# Patient Record
Sex: MALE | Race: WHITE | Employment: OTHER | URBAN - METROPOLITAN AREA
[De-identification: names, ages, dates, MRNs, and addresses within clinical notes are randomized per-mention and may not be internally consistent; named-entity substitution may affect disease eponyms.]

---

## 2017-01-03 RX ORDER — LOVASTATIN 40 MG/1
TABLET ORAL
Qty: 30 TABLET | Refills: 5 | Status: SHIPPED | OUTPATIENT
Start: 2017-01-03 | End: 2017-08-08

## 2017-01-09 RX ORDER — WARFARIN SODIUM 5 MG/1
TABLET ORAL
Qty: 90 TABLET | Refills: 1 | Status: SHIPPED | OUTPATIENT
Start: 2017-01-09 | End: 2017-03-31 | Stop reason: DRUGHIGH

## 2017-01-09 NOTE — TELEPHONE ENCOUNTER
Was patient advised to contact pharmacy directly?:  Yes    Is patient out of meds or supply very low?:  Very low    Medication Requested:  Warfarin Sodium (COUMADIN) 5 MG Oral Tab    Dose:   5mg x 5 days, 2.5mg x 2 days    Is patient requesting a 30 or 90

## 2017-01-09 NOTE — TELEPHONE ENCOUNTER
Last OV pertinent to medication: 12/9/16 for DM check   Last refill date: 5/6/14     #/refills: #180 + 3  When pt was asked to return for OV: 6 months  Upcoming appt/reason: No future appt      Lab Results  Component Value Date   PT 14.9* 12/08/2013   INR

## 2017-01-19 ENCOUNTER — OFFICE VISIT (OUTPATIENT)
Dept: INTERNAL MEDICINE CLINIC | Facility: CLINIC | Age: 72
End: 2017-01-19

## 2017-01-19 VITALS
HEIGHT: 71 IN | RESPIRATION RATE: 16 BRPM | HEART RATE: 103 BPM | BODY MASS INDEX: 37.38 KG/M2 | TEMPERATURE: 98 F | WEIGHT: 267 LBS | SYSTOLIC BLOOD PRESSURE: 110 MMHG | DIASTOLIC BLOOD PRESSURE: 74 MMHG | OXYGEN SATURATION: 98 %

## 2017-01-19 DIAGNOSIS — G89.29 CHRONIC RIGHT-SIDED LOW BACK PAIN WITHOUT SCIATICA: ICD-10-CM

## 2017-01-19 DIAGNOSIS — M54.50 CHRONIC RIGHT-SIDED LOW BACK PAIN WITHOUT SCIATICA: ICD-10-CM

## 2017-01-19 DIAGNOSIS — H61.21 IMPACTED CERUMEN OF RIGHT EAR: Primary | ICD-10-CM

## 2017-01-19 PROCEDURE — 99213 OFFICE O/P EST LOW 20 MIN: CPT | Performed by: INTERNAL MEDICINE

## 2017-01-19 NOTE — PROGRESS NOTES
Ousmane Alvarez is a 66year old male  Patient presents with:  Ear Problem      HPI:   Right ear clogged for a few weeks, no pain, some vertigo potisitonal and losing hearing  Also ongoing LBP on the right,very positional, for 3 mos, never went to PT, s Status: Never Used                        Alcohol Use: Yes           0.0 oz/week       0 Standard drinks or equivalent per week     Patient Active Problem List:     Obesity, unspecified     Chronic atrial fibrillation (HCC)     Benign neoplasm of colon

## 2017-01-19 NOTE — PATIENT INSTRUCTIONS
Debrox or cerumenex to ears daily at bedtime and allow water to run freely into ears daily the next morning and return for earwash

## 2017-01-25 RX ORDER — GLIMEPIRIDE 2 MG/1
TABLET ORAL
Qty: 90 TABLET | Refills: 0 | Status: SHIPPED | OUTPATIENT
Start: 2017-01-25 | End: 2017-08-31

## 2017-01-26 ENCOUNTER — OFFICE VISIT (OUTPATIENT)
Dept: INTERNAL MEDICINE CLINIC | Facility: CLINIC | Age: 72
End: 2017-01-26

## 2017-01-26 VITALS
DIASTOLIC BLOOD PRESSURE: 80 MMHG | SYSTOLIC BLOOD PRESSURE: 122 MMHG | BODY MASS INDEX: 37.38 KG/M2 | RESPIRATION RATE: 16 BRPM | WEIGHT: 267 LBS | HEIGHT: 71 IN | TEMPERATURE: 98 F | OXYGEN SATURATION: 97 % | HEART RATE: 82 BPM

## 2017-01-26 DIAGNOSIS — H61.21 HEARING LOSS DUE TO CERUMEN IMPACTION, RIGHT: Primary | ICD-10-CM

## 2017-01-26 PROCEDURE — 69209 REMOVE IMPACTED EAR WAX UNI: CPT | Performed by: INTERNAL MEDICINE

## 2017-01-26 NOTE — PROGRESS NOTES
Jannette Garrett is a 66year old male  Patient presents with:  Ear Problem: Ear Wash      HPI:   Cerumen impaction right, using drops, feels a bit better     Current Outpatient Prescriptions:  carbamide peroxide 6.5 % Otic Solution Place 5 drops into gt Alcohol Use: Yes           0.0 oz/week       0 Standard drinks or equivalent per week     Patient Active Problem List:     Obesity, unspecified     Chronic atrial fibrillation (HCC)     Benign neoplasm of colon     Hypertrophy of prostate without urina

## 2017-02-03 ENCOUNTER — TELEPHONE (OUTPATIENT)
Dept: INTERNAL MEDICINE CLINIC | Facility: CLINIC | Age: 72
End: 2017-02-03

## 2017-02-03 DIAGNOSIS — N28.1 RENAL CYST: Primary | ICD-10-CM

## 2017-02-03 NOTE — TELEPHONE ENCOUNTER
Pt had an MRI 1/31/17, ordered by orthopedic surgeon Em Hairston, incidental finding of probable cyst on kidney. Result in Epic. Also has concerns about taking gabapentin. Aware office closed, pt okay with callback on Monday.

## 2017-02-06 NOTE — TELEPHONE ENCOUNTER
Spoke with pt. Advised as below that it's OK to take gabapentin for now and that renal cysts not likely to be clinically significant, but Kidney US recommended for further evaluation at this time. Pt voiced understanding.  Gave pt contact information for [facility]

## 2017-02-07 ENCOUNTER — HOSPITAL ENCOUNTER (OUTPATIENT)
Dept: ULTRASOUND IMAGING | Facility: HOSPITAL | Age: 72
Discharge: HOME | End: 2017-02-07
Attending: INTERNAL MEDICINE
Payer: MEDICARE

## 2017-02-07 DIAGNOSIS — N28.1 RENAL CYST: ICD-10-CM

## 2017-02-07 PROCEDURE — 76775 US EXAM ABDO BACK WALL LIM: CPT

## 2017-02-14 PROBLEM — M54.50 CHRONIC BILATERAL LOW BACK PAIN WITHOUT SCIATICA: Status: ACTIVE | Noted: 2017-02-14

## 2017-02-14 PROBLEM — G89.29 CHRONIC BILATERAL LOW BACK PAIN WITHOUT SCIATICA: Status: ACTIVE | Noted: 2017-02-14

## 2017-02-14 RX ORDER — GABAPENTIN 300 MG/1
CAPSULE ORAL
Qty: 270 CAPSULE | Refills: 0 | Status: ON HOLD | OUTPATIENT
Start: 2017-02-14 | End: 2017-05-20

## 2017-02-14 NOTE — TELEPHONE ENCOUNTER
Last OV pertinent to medication: 1/19/17  Last refill date: 11/19/16     #/refills: 270+0 refills  When pt was asked to return for OV: 6 months from 12/9/16 visit  Upcoming appt/reason: none scheduled yet     Lab Results  Component Value Date   * 09

## 2017-03-03 ENCOUNTER — LAB ENCOUNTER (OUTPATIENT)
Dept: LAB | Facility: HOSPITAL | Age: 72
End: 2017-03-03
Attending: PODIATRIST
Payer: MEDICARE

## 2017-03-03 ENCOUNTER — OFFICE VISIT (OUTPATIENT)
Dept: WOUND CARE | Age: 72
End: 2017-03-03
Attending: PODIATRIST
Payer: MEDICARE

## 2017-03-03 DIAGNOSIS — E11.621 TYPE 2 DIABETES MELLITUS WITH FOOT ULCER (HCC): Primary | ICD-10-CM

## 2017-03-03 DIAGNOSIS — L97.919 NON-PRESSURE CHRONIC ULCER OF UNSPECIFIED PART OF RIGHT LOWER LEG WITH UNSPECIFIED SEVERITY (HCC): ICD-10-CM

## 2017-03-03 DIAGNOSIS — L97.522 NON-PRESSURE CHRONIC ULCER OF OTHER PART OF LEFT FOOT WITH FAT LAYER EXPOSED (HCC): ICD-10-CM

## 2017-03-03 DIAGNOSIS — L97.512 NON-PRESSURE CHRONIC ULCER OF OTHER PART OF RIGHT FOOT WITH FAT LAYER EXPOSED (HCC): ICD-10-CM

## 2017-03-03 DIAGNOSIS — L97.509 TYPE 2 DIABETES MELLITUS WITH FOOT ULCER (HCC): Primary | ICD-10-CM

## 2017-03-03 DIAGNOSIS — L97.509 DIABETIC FOOT ULCER (HCC): ICD-10-CM

## 2017-03-03 DIAGNOSIS — E11.621 DIABETIC FOOT ULCER (HCC): ICD-10-CM

## 2017-03-03 LAB
ALBUMIN SERPL-MCNC: 3.8 G/DL (ref 3.5–4.8)
ALP LIVER SERPL-CCNC: 35 U/L (ref 45–117)
ALT SERPL-CCNC: 24 U/L (ref 17–63)
AST SERPL-CCNC: 21 U/L (ref 15–41)
BASOPHILS # BLD AUTO: 0.12 X10(3) UL (ref 0–0.1)
BASOPHILS NFR BLD AUTO: 1.2 %
BILIRUB SERPL-MCNC: 0.6 MG/DL (ref 0.1–2)
BUN BLD-MCNC: 11 MG/DL (ref 8–20)
CALCIUM BLD-MCNC: 9.8 MG/DL (ref 8.3–10.3)
CHLORIDE: 108 MMOL/L (ref 101–111)
CO2: 27 MMOL/L (ref 22–32)
CREAT BLD-MCNC: 1.08 MG/DL (ref 0.7–1.3)
EOSINOPHIL # BLD AUTO: 0.14 X10(3) UL (ref 0–0.3)
EOSINOPHIL NFR BLD AUTO: 1.4 %
ERYTHROCYTE [DISTWIDTH] IN BLOOD BY AUTOMATED COUNT: 13.5 % (ref 11.5–16)
EST. AVERAGE GLUCOSE BLD GHB EST-MCNC: 140 MG/DL (ref 68–126)
GLUCOSE BLD-MCNC: 108 MG/DL (ref 70–99)
HBA1C MFR BLD HPLC: 6.5 % (ref ?–5.7)
HCT VFR BLD AUTO: 48.8 % (ref 37–53)
HGB BLD-MCNC: 16.6 G/DL (ref 13–17)
IMMATURE GRANULOCYTE COUNT: 0.02 X10(3) UL (ref 0–1)
IMMATURE GRANULOCYTE RATIO %: 0.2 %
LYMPHOCYTES # BLD AUTO: 0.89 X10(3) UL (ref 0.9–4)
LYMPHOCYTES NFR BLD AUTO: 9.1 %
M PROTEIN MFR SERPL ELPH: 8.3 G/DL (ref 6.1–8.3)
MCH RBC QN AUTO: 32.2 PG (ref 27–33.2)
MCHC RBC AUTO-ENTMCNC: 34 G/DL (ref 31–37)
MCV RBC AUTO: 94.6 FL (ref 80–99)
MONOCYTES # BLD AUTO: 0.76 X10(3) UL (ref 0.1–0.6)
MONOCYTES NFR BLD AUTO: 7.8 %
NEUTROPHIL ABS PRELIM: 7.87 X10 (3) UL (ref 1.3–6.7)
NEUTROPHILS # BLD AUTO: 7.87 X10(3) UL (ref 1.3–6.7)
NEUTROPHILS NFR BLD AUTO: 80.3 %
PLATELET # BLD AUTO: 384 10(3)UL (ref 150–450)
POTASSIUM SERPL-SCNC: 4.4 MMOL/L (ref 3.6–5.1)
PREALBUMIN: 25 MG/DL (ref 20–40)
RBC # BLD AUTO: 5.16 X10(6)UL (ref 3.8–5.8)
RED CELL DISTRIBUTION WIDTH-SD: 47.1 FL (ref 35.1–46.3)
SODIUM SERPL-SCNC: 141 MMOL/L (ref 136–144)
WBC # BLD AUTO: 9.8 X10(3) UL (ref 4–13)

## 2017-03-03 PROCEDURE — 84134 ASSAY OF PREALBUMIN: CPT

## 2017-03-03 PROCEDURE — 80053 COMPREHEN METABOLIC PANEL: CPT

## 2017-03-03 PROCEDURE — 85025 COMPLETE CBC W/AUTO DIFF WBC: CPT

## 2017-03-03 PROCEDURE — 11042 DBRDMT SUBQ TIS 1ST 20SQCM/<: CPT

## 2017-03-03 PROCEDURE — 36415 COLL VENOUS BLD VENIPUNCTURE: CPT

## 2017-03-03 PROCEDURE — 99215 OFFICE O/P EST HI 40 MIN: CPT

## 2017-03-03 PROCEDURE — 83036 HEMOGLOBIN GLYCOSYLATED A1C: CPT

## 2017-03-04 NOTE — PROGRESS NOTES
Progress Note Details  Patient Name: Geovani Guardado Date: 3/3/2017   Patient Number: [id number] Physician / Frantz Gone: Mayra Hurd   Patient YOB: 1945 Facility: [facility]    Chief Complaint  This information was obtain 3/3/17-Initial visit: 65 yo CM presents to the clinic for bilateral diabetic foot ulcers and right lower leg venous ulcer evaluation and management. Pt has PMH of DM, Afib on Coumadin, PAD, HTN, Hyperlipidemia, BPH and Obesity.  He last saw Ayde Hair 3/1/1 Osteoarthritis  PAD  diabetic retinopathy  CVA    Surgical History  This information was obtained from the patient  Patient has a surgical history of:  stenting right lower leg  Foot Surgery - 12/1/2013 (small toe right foot, amputation)    Complaints and Multi Vitamin 9 mg iron/15 mL oral liquid oral liquid oral  ascorbic acid (vitamin C)  mg capsule,extended release oral capsule, extended release oral        Objective    Constitutional  BP WNL. Pulse Irregular, chronic afib, controlled rate.  RR with Wound #15 Right, Plantar Foot is a Bedoya Grade 1 Diabetic Ulcer and has received a status of Not Healed. Initial wound encounter measurements are 1.7cm length x 1.9cm width x 0.6cm depth, with an area of 3.23 sq cm and a volume of 1.938 cubic cm.  Nehal hayward Wound #17 Right, Anterior Lower Leg is a chronic Full Thickness Venous Ulcer and has received a status of Not Healed. Initial wound encounter measurements are 1cm length x 0.7cm width x 0.1cm depth, with an area of 0.7 sq cm and a volume of 0.07 cubic cm. 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 Change if soiled. Off-Loading  Other: - Use diabetic shoes  Right foot    Wound #16 Left, Plantar Foot     Wound Cleansing & Dressings  May not shower.   Cleanse with saline  Honey Gel  Thick Foam  Kerlix  Paper tape CBC - Non-healing diabetic foot ulcers, CMP - Non-healing diabetic foot ulcers, Pre-albumin - Non-healing diabetic foot ulcers, Hemoglobin A1C - Non-healing diabetic foot ulcers  Cardiovascular:   Arterial duplex ultrasound - I73.9 Please include SUSAN and T

## 2017-03-06 ENCOUNTER — OFFICE VISIT (OUTPATIENT)
Dept: WOUND CARE | Age: 72
End: 2017-03-06
Attending: PODIATRIST
Payer: MEDICARE

## 2017-03-06 DIAGNOSIS — L97.919 NON-PRESSURE CHRONIC ULCER OF UNSPECIFIED PART OF RIGHT LOWER LEG WITH UNSPECIFIED SEVERITY (HCC): Primary | ICD-10-CM

## 2017-03-06 DIAGNOSIS — L97.509 TYPE 2 DIABETES MELLITUS WITH FOOT ULCER (HCC): ICD-10-CM

## 2017-03-06 DIAGNOSIS — L97.522 NON-PRESSURE CHRONIC ULCER OF OTHER PART OF LEFT FOOT WITH FAT LAYER EXPOSED (HCC): ICD-10-CM

## 2017-03-06 DIAGNOSIS — E11.621 TYPE 2 DIABETES MELLITUS WITH FOOT ULCER (HCC): ICD-10-CM

## 2017-03-06 PROCEDURE — 11042 DBRDMT SUBQ TIS 1ST 20SQCM/<: CPT

## 2017-03-10 ENCOUNTER — HOSPITAL ENCOUNTER (OUTPATIENT)
Dept: CARDIOLOGY CLINIC | Facility: HOSPITAL | Age: 72
Discharge: HOME | End: 2017-03-10
Attending: NURSE PRACTITIONER
Payer: MEDICARE

## 2017-03-10 DIAGNOSIS — I73.9 PERIPHERAL VASCULAR DISEASE, UNSPECIFIED (HCC): ICD-10-CM

## 2017-03-10 PROCEDURE — 93925 LOWER EXTREMITY STUDY: CPT

## 2017-03-20 ENCOUNTER — OFFICE VISIT (OUTPATIENT)
Dept: WOUND CARE | Age: 72
DRG: 617 | End: 2017-03-20
Attending: PODIATRIST
Payer: MEDICARE

## 2017-03-20 ENCOUNTER — HOSPITAL ENCOUNTER (INPATIENT)
Facility: HOSPITAL | Age: 72
LOS: 5 days | Discharge: NURSING, REHAB OR LONG-TERM CARE | DRG: 617 | End: 2017-03-25
Attending: EMERGENCY MEDICINE | Admitting: HOSPITALIST
Payer: MEDICARE

## 2017-03-20 ENCOUNTER — APPOINTMENT (OUTPATIENT)
Dept: GENERAL RADIOLOGY | Facility: HOSPITAL | Age: 72
DRG: 617 | End: 2017-03-20
Attending: EMERGENCY MEDICINE
Payer: MEDICARE

## 2017-03-20 DIAGNOSIS — L97.509 TYPE 2 DIABETES MELLITUS WITH FOOT ULCER (HCC): ICD-10-CM

## 2017-03-20 DIAGNOSIS — L97.512 RIGHT FOOT ULCER, WITH FAT LAYER EXPOSED (HCC): Primary | ICD-10-CM

## 2017-03-20 DIAGNOSIS — E11.621 TYPE 2 DIABETES MELLITUS WITH FOOT ULCER (HCC): ICD-10-CM

## 2017-03-20 DIAGNOSIS — L97.522 NON-PRESSURE CHRONIC ULCER OF OTHER PART OF LEFT FOOT WITH FAT LAYER EXPOSED (HCC): ICD-10-CM

## 2017-03-20 DIAGNOSIS — L03.119 CELLULITIS OF FOOT: Primary | ICD-10-CM

## 2017-03-20 DIAGNOSIS — L97.919 NON-PRESSURE CHRONIC ULCER OF UNSPECIFIED PART OF RIGHT LOWER LEG WITH UNSPECIFIED SEVERITY (HCC): ICD-10-CM

## 2017-03-20 LAB
ALBUMIN SERPL-MCNC: 3 G/DL (ref 3.5–4.8)
ALP LIVER SERPL-CCNC: 39 U/L (ref 45–117)
ALT SERPL-CCNC: 24 U/L (ref 17–63)
AST SERPL-CCNC: 25 U/L (ref 15–41)
BASOPHILS # BLD AUTO: 0.06 X10(3) UL (ref 0–0.1)
BASOPHILS NFR BLD AUTO: 0.5 %
BILIRUB SERPL-MCNC: 0.7 MG/DL (ref 0.1–2)
BUN BLD-MCNC: 26 MG/DL (ref 8–20)
CALCIUM BLD-MCNC: 9.4 MG/DL (ref 8.3–10.3)
CHLORIDE: 100 MMOL/L (ref 101–111)
CO2: 25 MMOL/L (ref 22–32)
CREAT BLD-MCNC: 1.31 MG/DL (ref 0.7–1.3)
EOSINOPHIL # BLD AUTO: 0 X10(3) UL (ref 0–0.3)
EOSINOPHIL NFR BLD AUTO: 0 %
ERYTHROCYTE [DISTWIDTH] IN BLOOD BY AUTOMATED COUNT: 13.2 % (ref 11.5–16)
GLUCOSE BLD-MCNC: 107 MG/DL (ref 65–99)
GLUCOSE BLD-MCNC: 126 MG/DL (ref 70–99)
GLUCOSE BLD-MCNC: 165 MG/DL (ref 65–99)
HCT VFR BLD AUTO: 47.2 % (ref 37–53)
HGB BLD-MCNC: 15.9 G/DL (ref 13–17)
IMMATURE GRANULOCYTE COUNT: 0.04 X10(3) UL (ref 0–1)
IMMATURE GRANULOCYTE RATIO %: 0.3 %
INR BLD: 1.74 (ref 0.89–1.11)
LYMPHOCYTES # BLD AUTO: 0.47 X10(3) UL (ref 0.9–4)
LYMPHOCYTES NFR BLD AUTO: 3.9 %
M PROTEIN MFR SERPL ELPH: 8.6 G/DL (ref 6.1–8.3)
MCH RBC QN AUTO: 31.8 PG (ref 27–33.2)
MCHC RBC AUTO-ENTMCNC: 33.7 G/DL (ref 31–37)
MCV RBC AUTO: 94.4 FL (ref 80–99)
MONOCYTES # BLD AUTO: 1.63 X10(3) UL (ref 0.1–0.6)
MONOCYTES NFR BLD AUTO: 13.7 %
NEUTROPHIL ABS PRELIM: 9.71 X10 (3) UL (ref 1.3–6.7)
NEUTROPHILS # BLD AUTO: 9.71 X10(3) UL (ref 1.3–6.7)
NEUTROPHILS NFR BLD AUTO: 81.6 %
PLATELET # BLD AUTO: 365 10(3)UL (ref 150–450)
POTASSIUM SERPL-SCNC: 4.5 MMOL/L (ref 3.6–5.1)
PSA SERPL DL<=0.01 NG/ML-MCNC: 20.6 SECONDS (ref 12–14.3)
RBC # BLD AUTO: 5 X10(6)UL (ref 3.8–5.8)
RED CELL DISTRIBUTION WIDTH-SD: 45.8 FL (ref 35.1–46.3)
SED RATE-ML: 42 MM/HR (ref 0–12)
SODIUM SERPL-SCNC: 136 MMOL/L (ref 136–144)
WBC # BLD AUTO: 11.9 X10(3) UL (ref 4–13)

## 2017-03-20 PROCEDURE — 87070 CULTURE OTHR SPECIMN AEROBIC: CPT

## 2017-03-20 PROCEDURE — 87077 CULTURE AEROBIC IDENTIFY: CPT

## 2017-03-20 PROCEDURE — 99223 1ST HOSP IP/OBS HIGH 75: CPT | Performed by: HOSPITALIST

## 2017-03-20 PROCEDURE — 73630 X-RAY EXAM OF FOOT: CPT

## 2017-03-20 PROCEDURE — 87147 CULTURE TYPE IMMUNOLOGIC: CPT

## 2017-03-20 PROCEDURE — 87205 SMEAR GRAM STAIN: CPT

## 2017-03-20 PROCEDURE — 99214 OFFICE O/P EST MOD 30 MIN: CPT

## 2017-03-20 PROCEDURE — 87186 SC STD MICRODIL/AGAR DIL: CPT

## 2017-03-20 RX ORDER — ACETAMINOPHEN 325 MG/1
650 TABLET ORAL EVERY 6 HOURS PRN
Status: DISCONTINUED | OUTPATIENT
Start: 2017-03-20 | End: 2017-03-25

## 2017-03-20 RX ORDER — ATORVASTATIN CALCIUM 10 MG/1
10 TABLET, FILM COATED ORAL NIGHTLY
Status: DISCONTINUED | OUTPATIENT
Start: 2017-03-20 | End: 2017-03-22

## 2017-03-20 RX ORDER — ENOXAPARIN SODIUM 100 MG/ML
40 INJECTION SUBCUTANEOUS DAILY
Status: DISCONTINUED | OUTPATIENT
Start: 2017-03-20 | End: 2017-03-20

## 2017-03-20 RX ORDER — LISINOPRIL 2.5 MG/1
2.5 TABLET ORAL
Status: DISCONTINUED | OUTPATIENT
Start: 2017-03-20 | End: 2017-03-25

## 2017-03-20 RX ORDER — GABAPENTIN 300 MG/1
300 CAPSULE ORAL DAILY
Status: DISCONTINUED | OUTPATIENT
Start: 2017-03-21 | End: 2017-03-25

## 2017-03-20 RX ORDER — WARFARIN SODIUM 2.5 MG/1
2.5 TABLET ORAL
Status: DISCONTINUED | OUTPATIENT
Start: 2017-03-24 | End: 2017-03-25

## 2017-03-20 RX ORDER — WARFARIN SODIUM 5 MG/1
5 TABLET ORAL
Status: DISCONTINUED | OUTPATIENT
Start: 2017-03-20 | End: 2017-03-25

## 2017-03-20 RX ORDER — GABAPENTIN 300 MG/1
600 CAPSULE ORAL NIGHTLY
COMMUNITY
End: 2017-08-03

## 2017-03-20 RX ORDER — HYDROCODONE BITARTRATE AND ACETAMINOPHEN 5; 325 MG/1; MG/1
1 TABLET ORAL EVERY 4 HOURS PRN
Status: DISCONTINUED | OUTPATIENT
Start: 2017-03-20 | End: 2017-03-25

## 2017-03-20 RX ORDER — HYDROCODONE BITARTRATE AND ACETAMINOPHEN 5; 325 MG/1; MG/1
2 TABLET ORAL EVERY 4 HOURS PRN
Status: DISCONTINUED | OUTPATIENT
Start: 2017-03-20 | End: 2017-03-25

## 2017-03-20 RX ORDER — DIGOXIN 250 MCG
250 TABLET ORAL DAILY
Status: DISCONTINUED | OUTPATIENT
Start: 2017-03-20 | End: 2017-03-25

## 2017-03-20 RX ORDER — WARFARIN SODIUM 2.5 MG/1
2.5 TABLET ORAL WEEKLY
COMMUNITY
End: 2017-03-31 | Stop reason: DRUGHIGH

## 2017-03-20 RX ORDER — POLYETHYLENE GLYCOL 3350 17 G/17G
17 POWDER, FOR SOLUTION ORAL DAILY PRN
Status: DISCONTINUED | OUTPATIENT
Start: 2017-03-20 | End: 2017-03-25

## 2017-03-20 RX ORDER — ONDANSETRON 2 MG/ML
4 INJECTION INTRAMUSCULAR; INTRAVENOUS EVERY 4 HOURS PRN
Status: DISCONTINUED | OUTPATIENT
Start: 2017-03-20 | End: 2017-03-20

## 2017-03-20 RX ORDER — SODIUM PHOSPHATE, DIBASIC AND SODIUM PHOSPHATE, MONOBASIC 7; 19 G/133ML; G/133ML
1 ENEMA RECTAL ONCE AS NEEDED
Status: ACTIVE | OUTPATIENT
Start: 2017-03-20 | End: 2017-03-20

## 2017-03-20 RX ORDER — DOCUSATE SODIUM 100 MG/1
100 CAPSULE, LIQUID FILLED ORAL 2 TIMES DAILY
Status: DISCONTINUED | OUTPATIENT
Start: 2017-03-20 | End: 2017-03-25

## 2017-03-20 RX ORDER — BISACODYL 10 MG
10 SUPPOSITORY, RECTAL RECTAL
Status: DISCONTINUED | OUTPATIENT
Start: 2017-03-20 | End: 2017-03-25

## 2017-03-20 RX ORDER — DEXTROSE MONOHYDRATE 25 G/50ML
50 INJECTION, SOLUTION INTRAVENOUS
Status: DISCONTINUED | OUTPATIENT
Start: 2017-03-20 | End: 2017-03-22

## 2017-03-20 RX ORDER — GABAPENTIN 300 MG/1
600 CAPSULE ORAL NIGHTLY
Status: DISCONTINUED | OUTPATIENT
Start: 2017-03-20 | End: 2017-03-22

## 2017-03-20 RX ORDER — ACETAMINOPHEN 325 MG/1
650 TABLET ORAL EVERY 4 HOURS PRN
Status: DISCONTINUED | OUTPATIENT
Start: 2017-03-20 | End: 2017-03-25

## 2017-03-20 RX ORDER — ONDANSETRON 2 MG/ML
4 INJECTION INTRAMUSCULAR; INTRAVENOUS EVERY 6 HOURS PRN
Status: DISCONTINUED | OUTPATIENT
Start: 2017-03-20 | End: 2017-03-25

## 2017-03-20 RX ORDER — METOPROLOL SUCCINATE 100 MG/1
100 TABLET, EXTENDED RELEASE ORAL
Status: DISCONTINUED | OUTPATIENT
Start: 2017-03-20 | End: 2017-03-25

## 2017-03-20 RX ORDER — GABAPENTIN 300 MG/1
300 CAPSULE ORAL DAILY
COMMUNITY
End: 2017-08-03

## 2017-03-20 NOTE — PROGRESS NOTES
[facility] Pharmacy Note:  Renal Adjustment for Unasyn (ampicillin/sulbactam)    Juanis Bowie is a 66year old male who has been prescribed Unasyn (ampicillin/sulbactam) 1.5 gm IVPB every 6 hrs.   CrCl is estimated creatinine clearance is 55.9 mL/min (based

## 2017-03-20 NOTE — ED PROVIDER NOTES
The patient will be admitted for further evaluation treatment.   Patient Seen in: [facility] Emergency Department    History   Patient presents with:  Cellulitis (integumentary, infectious)  Fever Sepsis (infectious)    Stated Complaint: R foot diabeti hypertension    • Type II or unspecified type diabetes mellitus without mention of complication, not stated as uncontrolled    • Other and unspecified hyperlipidemia    • Visual disturbance      possible VF cut superior left eye   • Ischemic optic neuropat 29G X 1/2\" 1 ML Does not apply Misc,  AS DIRECTED       Family History   Problem Relation Age of Onset   • Cancer Father    • Stroke Mother    • Cancer Mother 61     colon cancer   • Cancer Paternal Grandfather      lymphatic   • Heart Attack     • Other[ to the right midfoot with some swelling there is a diabetic foot ulcer in the right foot . Here is no edema    NEURO: Alert and oriented x3.   Muscle strength is normal but his sensory exam is diminished on both lower extremities he does have peripheral ne above is noted. No definite osseous destructive changes.     Patient's comprehensive shows slightly elevated BUN at 26 creatinine 1.31 glucose of 126. CBC was within normal limits. Patient sed rate was slightly elevated 42.   Blood cultures were obtaine

## 2017-03-20 NOTE — H&P
[facility]  History and Physical     Savanna Sutton Patient Status:  Inpatient    1945 MRN [id number]   [facility] 3NW-A Attending Ruben [facility] Day # 0 PCP Cornelius Bland MD     Chief Complain [facility])    • Tonsillitis    • Appendicitis    • Internal hemorrhoids    • Arm pain      pain ini  right biceps   • Neuropathy ([facility])    • Hammertoe    • Bunion    • Onychomycosis    • Tinea pedis    • Osteomyelitis ([facility])    • PAD (peripheral artery disease) ( encounter.   Current Outpatient Prescriptions on File Prior to Encounter:  GLIMEPIRIDE 2 MG Oral Tab TAKE 1 TABLET BY MOUTH DAILY WITH BREAKFAST Disp: 90 tablet Rfl: 0   Warfarin Sodium 5 MG Oral Tab 5mg x 5 days, 2.5mg x 2 days (Patient taking differently: focal neurological deficits. CNII-XII grossly intact. Musculoskeletal: Moves all extremities. Extremities: No edema or cyanosis. Integument: No rashes or lesions. Psychiatric: Appropriate mood and affect.       Diagnostic Data:      Labs:  Recent Labs documentation in H+P. Based on patients current state of illness, I anticipate that, after discharge, patient will require TBD.

## 2017-03-21 ENCOUNTER — APPOINTMENT (OUTPATIENT)
Dept: MRI IMAGING | Facility: HOSPITAL | Age: 72
DRG: 617 | End: 2017-03-21
Attending: PODIATRIST
Payer: MEDICARE

## 2017-03-21 ENCOUNTER — ANESTHESIA (OUTPATIENT)
Dept: SURGERY | Facility: HOSPITAL | Age: 72
DRG: 617 | End: 2017-03-21
Payer: MEDICARE

## 2017-03-21 ENCOUNTER — ANESTHESIA EVENT (OUTPATIENT)
Dept: SURGERY | Facility: HOSPITAL | Age: 72
DRG: 617 | End: 2017-03-21
Payer: MEDICARE

## 2017-03-21 ENCOUNTER — SURGERY (OUTPATIENT)
Age: 72
End: 2017-03-21

## 2017-03-21 PROBLEM — T45.515A WARFARIN-INDUCED COAGULOPATHY: Status: ACTIVE | Noted: 2017-03-21

## 2017-03-21 PROBLEM — T45.515A WARFARIN-INDUCED COAGULOPATHY (HCC): Status: ACTIVE | Noted: 2017-03-21

## 2017-03-21 PROBLEM — D68.32 WARFARIN-INDUCED COAGULOPATHY (HCC): Status: ACTIVE | Noted: 2017-03-21

## 2017-03-21 PROBLEM — M86.9 OSTEOMYELITIS OF FOOT (HCC): Status: ACTIVE | Noted: 2017-03-21

## 2017-03-21 PROBLEM — D68.32 WARFARIN-INDUCED COAGULOPATHY: Status: ACTIVE | Noted: 2017-03-21

## 2017-03-21 LAB
ANTIBODY SCREEN: NEGATIVE
BUN BLD-MCNC: 21 MG/DL (ref 8–20)
CALCIUM BLD-MCNC: 8.8 MG/DL (ref 8.3–10.3)
CHLORIDE: 102 MMOL/L (ref 101–111)
CO2: 27 MMOL/L (ref 22–32)
CREAT BLD-MCNC: 0.93 MG/DL (ref 0.7–1.3)
ERYTHROCYTE [DISTWIDTH] IN BLOOD BY AUTOMATED COUNT: 13 % (ref 11.5–16)
GLUCOSE BLD-MCNC: 113 MG/DL (ref 65–99)
GLUCOSE BLD-MCNC: 116 MG/DL (ref 65–99)
GLUCOSE BLD-MCNC: 117 MG/DL (ref 65–99)
GLUCOSE BLD-MCNC: 118 MG/DL (ref 70–99)
GLUCOSE BLD-MCNC: 153 MG/DL (ref 65–99)
GLUCOSE BLD-MCNC: 97 MG/DL (ref 65–99)
HCT VFR BLD AUTO: 44.6 % (ref 37–53)
HGB BLD-MCNC: 15.4 G/DL (ref 13–17)
INR BLD: 1.6 (ref 0.89–1.11)
MCH RBC QN AUTO: 32.2 PG (ref 27–33.2)
MCHC RBC AUTO-ENTMCNC: 34.5 G/DL (ref 31–37)
MCV RBC AUTO: 93.1 FL (ref 80–99)
PLATELET # BLD AUTO: 313 10(3)UL (ref 150–450)
POTASSIUM SERPL-SCNC: 3.7 MMOL/L (ref 3.6–5.1)
PSA SERPL DL<=0.01 NG/ML-MCNC: 19.2 SECONDS (ref 12–14.3)
RBC # BLD AUTO: 4.79 X10(6)UL (ref 3.8–5.8)
RED CELL DISTRIBUTION WIDTH-SD: 44.5 FL (ref 35.1–46.3)
RH BLOOD TYPE: POSITIVE
SODIUM SERPL-SCNC: 137 MMOL/L (ref 136–144)
WBC # BLD AUTO: 10.6 X10(3) UL (ref 4–13)

## 2017-03-21 PROCEDURE — 0Y6T0Z0 DETACHMENT AT RIGHT 3RD TOE, COMPLETE, OPEN APPROACH: ICD-10-PCS | Performed by: PODIATRIST

## 2017-03-21 PROCEDURE — 0QBN0ZZ EXCISION OF RIGHT METATARSAL, OPEN APPROACH: ICD-10-PCS | Performed by: PODIATRIST

## 2017-03-21 PROCEDURE — 99233 SBSQ HOSP IP/OBS HIGH 50: CPT | Performed by: HOSPITALIST

## 2017-03-21 PROCEDURE — 30233K1 TRANSFUSION OF NONAUTOLOGOUS FROZEN PLASMA INTO PERIPHERAL VEIN, PERCUTANEOUS APPROACH: ICD-10-PCS | Performed by: HOSPITALIST

## 2017-03-21 PROCEDURE — 73718 MRI LOWER EXTREMITY W/O DYE: CPT

## 2017-03-21 RX ORDER — ACETAMINOPHEN 500 MG
1000 TABLET ORAL ONCE AS NEEDED
Status: DISCONTINUED | OUTPATIENT
Start: 2017-03-21 | End: 2017-03-21 | Stop reason: HOSPADM

## 2017-03-21 RX ORDER — DIPHENHYDRAMINE HYDROCHLORIDE 50 MG/ML
12.5 INJECTION INTRAMUSCULAR; INTRAVENOUS AS NEEDED
Status: DISCONTINUED | OUTPATIENT
Start: 2017-03-21 | End: 2017-03-21 | Stop reason: HOSPADM

## 2017-03-21 RX ORDER — SODIUM CHLORIDE, SODIUM LACTATE, POTASSIUM CHLORIDE, CALCIUM CHLORIDE 600; 310; 30; 20 MG/100ML; MG/100ML; MG/100ML; MG/100ML
INJECTION, SOLUTION INTRAVENOUS CONTINUOUS
Status: DISCONTINUED | OUTPATIENT
Start: 2017-03-21 | End: 2017-03-25

## 2017-03-21 RX ORDER — NALOXONE HYDROCHLORIDE 0.4 MG/ML
80 INJECTION, SOLUTION INTRAMUSCULAR; INTRAVENOUS; SUBCUTANEOUS AS NEEDED
Status: DISCONTINUED | OUTPATIENT
Start: 2017-03-21 | End: 2017-03-21 | Stop reason: HOSPADM

## 2017-03-21 RX ORDER — HYDROCODONE BITARTRATE AND ACETAMINOPHEN 5; 325 MG/1; MG/1
2 TABLET ORAL AS NEEDED
Status: DISCONTINUED | OUTPATIENT
Start: 2017-03-21 | End: 2017-03-21 | Stop reason: HOSPADM

## 2017-03-21 RX ORDER — INSULIN ASPART 100 [IU]/ML
INJECTION, SOLUTION INTRAVENOUS; SUBCUTANEOUS ONCE
Status: DISCONTINUED | OUTPATIENT
Start: 2017-03-21 | End: 2017-03-21 | Stop reason: HOSPADM

## 2017-03-21 RX ORDER — DEXTROSE MONOHYDRATE 25 G/50ML
50 INJECTION, SOLUTION INTRAVENOUS
Status: DISCONTINUED | OUTPATIENT
Start: 2017-03-21 | End: 2017-03-21 | Stop reason: HOSPADM

## 2017-03-21 RX ORDER — MIDAZOLAM HYDROCHLORIDE 1 MG/ML
1 INJECTION INTRAMUSCULAR; INTRAVENOUS EVERY 5 MIN PRN
Status: DISCONTINUED | OUTPATIENT
Start: 2017-03-21 | End: 2017-03-21 | Stop reason: HOSPADM

## 2017-03-21 RX ORDER — BUPIVACAINE HYDROCHLORIDE 5 MG/ML
INJECTION, SOLUTION EPIDURAL; INTRACAUDAL AS NEEDED
Status: DISCONTINUED | OUTPATIENT
Start: 2017-03-21 | End: 2017-03-21 | Stop reason: HOSPADM

## 2017-03-21 RX ORDER — ONDANSETRON 2 MG/ML
4 INJECTION INTRAMUSCULAR; INTRAVENOUS AS NEEDED
Status: DISCONTINUED | OUTPATIENT
Start: 2017-03-21 | End: 2017-03-21 | Stop reason: HOSPADM

## 2017-03-21 RX ORDER — HYDROCODONE BITARTRATE AND ACETAMINOPHEN 5; 325 MG/1; MG/1
1 TABLET ORAL AS NEEDED
Status: DISCONTINUED | OUTPATIENT
Start: 2017-03-21 | End: 2017-03-21 | Stop reason: HOSPADM

## 2017-03-21 RX ORDER — MEPERIDINE HYDROCHLORIDE 25 MG/ML
12.5 INJECTION INTRAMUSCULAR; INTRAVENOUS; SUBCUTANEOUS AS NEEDED
Status: DISCONTINUED | OUTPATIENT
Start: 2017-03-21 | End: 2017-03-21 | Stop reason: HOSPADM

## 2017-03-21 RX ORDER — SODIUM CHLORIDE 9 MG/ML
INJECTION, SOLUTION INTRAVENOUS ONCE
Status: COMPLETED | OUTPATIENT
Start: 2017-03-21 | End: 2017-03-21

## 2017-03-21 RX ORDER — POTASSIUM CHLORIDE 20 MEQ/1
40 TABLET, EXTENDED RELEASE ORAL ONCE
Status: COMPLETED | OUTPATIENT
Start: 2017-03-21 | End: 2017-03-21

## 2017-03-21 RX ORDER — HYDROMORPHONE HYDROCHLORIDE 1 MG/ML
0.4 INJECTION, SOLUTION INTRAMUSCULAR; INTRAVENOUS; SUBCUTANEOUS EVERY 5 MIN PRN
Status: DISCONTINUED | OUTPATIENT
Start: 2017-03-21 | End: 2017-03-21 | Stop reason: HOSPADM

## 2017-03-21 NOTE — CM/SW NOTE
Call received from [facility] [phone] that office received referral from [facility] and will be following for IVAB.   Message left for SCU GISELE.

## 2017-03-21 NOTE — PROGRESS NOTES
[facility]  Progress Note     Elisabeth Sutton Patient Status:  Inpatient    1945 MRN [id number]   [facility] 3NW-A Attending Gilda [address] Day # 1 PCP Oneyda Juárez MD     Chief Complaint: Fever docusate sodium  100 mg Oral BID   • ampicillin-sulbactam  3 g Intravenous Q6H   • insulin aspart  1-50 Units Subcutaneous TID CC and HS   • digoxin  250 mcg Oral Daily   • gabapentin  300 mg Oral Daily   • gabapentin  600 mg Oral Nightly   • insulin detem

## 2017-03-21 NOTE — PLAN OF CARE
Diabetes/Glucose Control    • Glucose maintained within prescribed range Progressing        Patient/Family Goals    • Patient/Family Long Term Goal Progressing    • Patient/Family Short Term Goal Progressing        SKIN/TISSUE INTEGRITY - ADULT    • Folri

## 2017-03-21 NOTE — PROGRESS NOTES
Npo for surgery , CAME BACK FROM MRI , I UNIT OF FFP STARTED , CONSENT OBTAINED , VITALS STABLE .  UPDATED WITH Pt . ANSWERED ALL QUESTIONS . VERBALIZED UNDERSTANDING

## 2017-03-21 NOTE — CONSULTS
[facility]  Report of Consultation    London Sutton Patient Status:  Inpatient    1945 MRN [id number]   [place] [facility] 3NW-A Attending [facility] Day # 1 PCP Marlen Martinez MD     Date of Admission: disturbance      possible VF cut superior left eye   • Ischemic optic neuropathy    • BDR (background diabetic retinopathy)      w/out macular edema    • Arrhythmia      afib   • High blood pressure    • Peripheral vascular disease (HCC)    • Visual impair 17 g, Oral, Daily PRN  •  magnesium hydroxide (MILK OF MAGNESIA) 400 MG/5ML suspension 30 mL, 30 mL, Oral, Daily PRN  •  bisacodyl (DULCOLAX) rectal suppository 10 mg, 10 mg, Rectal, Daily PRN  •  ondansetron HCl (ZOFRAN) injection 4 mg, 4 mg, Intravenous, neuropathic ulcer. There is erythema and edema noted to the right foot. Vascular: Patient has very weak pulses. Does have some history of vascular disease with some occlusions had an appointment to see Dr. Joey Vargas yesterday but that was canceled.   Neurol there after getting more information. The nature and extent of the surgery complications and risks up to and including further surgery hospitalization loss of more of his foot and/or the possibility of amputation at a higher level were all reviewed.   Chloe

## 2017-03-21 NOTE — BH ADDICTION ADDENDUM
The patient is in stable condition, his vital signs are stable, and he denies pain. Left foot diabetic ulcer began to bleed after the patient got up to the bathroom, old dressing removed, site redressed with Mepilex, gauze and kerlix.

## 2017-03-21 NOTE — CONSULTS
INFECTIOUS DISEASE CONSULT NOTE    Kevin Covert Herman Patient Status:  Inpatient    1945 MRN [id number]   [facility] 3NW-A Attending Jahaira Singh [facility] Day # 1 PCP ini  right biceps   • Neuropathy (HCC)    • Hammertoe    • Bunion    • Onychomycosis    • Tinea pedis    • Osteomyelitis (HCC)    • PAD (peripheral artery disease) ([facility])    • Unspecified essential hypertension    • Type II or unspecified type diabetes levi docusate sodium  100 mg Oral BID   • insulin aspart  1-50 Units Subcutaneous TID CC and HS   • digoxin  250 mcg Oral Daily   • gabapentin  300 mg Oral Daily   • gabapentin  600 mg Oral Nightly   • insulin detemir  11 Units Subcutaneous Nightly   • lisinopr neurological deficits. CNII-XII grossly intact. Musculoskeletal: Moves all extremities. Extremities: No edema or cyanosis.  R foot with edema and erythema extending the forefoot area around 2,3,4 toes, He has an open palntar wound approsx 1.5 - 2 cm in [facility]

## 2017-03-21 NOTE — PLAN OF CARE
Diabetes/Glucose Control    • Glucose maintained within prescribed range Progressing        Patient/Family Goals    • Patient/Family Long Term Goal Progressing    • Patient/Family Short Term Goal Progressing        SKIN/TISSUE INTEGRITY - ADULT    • Flori

## 2017-03-21 NOTE — ANESTHESIA PREPROCEDURE EVALUATION
PRE-OP EVALUATION    Patient Name: Vinayak Sutton    Pre-op Diagnosis: Infection of toe [L08.9]    Procedure(s):  AMPUTATION THIRD TOE RIGHT FOOT WITH IRRIGATION AND DEBRIDEMENT     Surgeon(s) and Role:     * Stephan JADE, ELLIOTM - Primary    Pre-op IVPB-minibag 3 g Intravenous Q6H   glucose (DEX4) oral liquid 15 g 15 g Oral Q15 Min PRN   Or      Glucose-Vitamin C (DEX-4) 4-0.006 g chewable tab 4 tablet 4 tablet Oral Q15 Min PRN   Or      dextrose injection 50 mL 50 mL Intravenous Q15 Min PRN   Or tablet (2.5 mg total) by mouth once daily.  Disp: 90 tablet Rfl: 3   DIGOX 250 MCG Oral Tab TAKE 1 TABLET BY MOUTH EVERY MORNING Disp: 90 tablet Rfl: 3   METOPROLOL SUCCINATE  MG Oral Tablet 24 Hr TAKE 1 TABLET BY MOUTH EVERY DAY Disp: 90 tablet Rfl:  03/21/2017   K 3.7 03/21/2017    03/21/2017   CO2 27.0 03/21/2017   BUN 21* 03/21/2017   CREATSERUM 0.93 03/21/2017   * 03/21/2017   CA 8.8 03/21/2017       Lab Results  Component Value Date   INR 1.74* 03/20/2017         Airway

## 2017-03-21 NOTE — CM/SW NOTE
03/21/17 1000   CM/SW Referral Data   Referral Source Physician;Social Work (self-referral)   Reason for Referral Discharge planning   Informant Patient   Pertinent Medical Hx   Primary Care Physician Name E-Stolzer   Social History   Domestic/Partner V

## 2017-03-21 NOTE — PROGRESS NOTES
[facility] dosing service    Initial Pharmacokinetic Consult for Vancomycin Dosing     Piedad Valentin is a 66year old male admitted on 3/20 who is being treated for cellulitis waiting on MRI to r/o osteo.   Pharmacy has been asked to dose Vancomycin trough level(s) prior to 4th dose. Goal trough level 15-20 ug/mL (pending MRI results on osteo). 3.  Pharmacy will need BUN/Scr daily while on Vancomycin to assess renal function.     4.  Pharmacy will follow and monitor renal function changes, toxicit

## 2017-03-22 LAB
GLUCOSE BLD-MCNC: 114 MG/DL (ref 65–99)
GLUCOSE BLD-MCNC: 116 MG/DL (ref 65–99)
GLUCOSE BLD-MCNC: 137 MG/DL (ref 65–99)
GLUCOSE BLD-MCNC: 138 MG/DL (ref 65–99)
POTASSIUM SERPL-SCNC: 3.7 MMOL/L (ref 3.6–5.1)

## 2017-03-22 PROCEDURE — 99232 SBSQ HOSP IP/OBS MODERATE 35: CPT | Performed by: HOSPITALIST

## 2017-03-22 RX ORDER — POTASSIUM CHLORIDE 20 MEQ/1
40 TABLET, EXTENDED RELEASE ORAL ONCE
Status: COMPLETED | OUTPATIENT
Start: 2017-03-22 | End: 2017-03-22

## 2017-03-22 RX ORDER — GABAPENTIN 300 MG/1
600 CAPSULE ORAL NIGHTLY
Status: DISCONTINUED | OUTPATIENT
Start: 2017-03-22 | End: 2017-03-25

## 2017-03-22 RX ORDER — ATORVASTATIN CALCIUM 10 MG/1
10 TABLET, FILM COATED ORAL NIGHTLY
Status: DISCONTINUED | OUTPATIENT
Start: 2017-03-22 | End: 2017-03-25

## 2017-03-22 RX ORDER — DEXTROSE MONOHYDRATE 25 G/50ML
50 INJECTION, SOLUTION INTRAVENOUS
Status: DISCONTINUED | OUTPATIENT
Start: 2017-03-22 | End: 2017-03-25

## 2017-03-22 NOTE — ANESTHESIA POSTPROCEDURE EVALUATION
401 Sonya Sutton Patient Status:  Inpatient   Age/Gender 66year old male MRN [id number]   [facility] Attending Jorge L Lobato, [facility] Day # 1 PCP Sharla Worley MD       Anesthesia Post

## 2017-03-22 NOTE — BRIEF OP NOTE
[facility] SURGERY  Brief Op Note     Rona Sutton Location: OR   CSN [id number] MRN [id number]   Admission Date 3/20/2017 Operation Date 3/21/2017   Attending Physician Al Kahn MD Operating Physician Ashley Michael DPM       Pre-Operat Estimated Blood Loss: 30 cc    Dictation Number:  [id number]    Sara Robins DPM  3/21/2017  8:23 PM

## 2017-03-22 NOTE — OPERATIVE REPORT
[facility]    PATIENT'S NAME: Reba Chavarria   ATTENDING PHYSICIAN: Cy Wolfe M.D. OPERATING PHYSICIAN: Guanako Tiwari D.P.M.    PATIENT ACCOUNT#:   [de-identified]    LOCATION:  3SW-A [facility]  MEDICAL RECORD #:   [id number]       DATE OF JESSI dorsally beginning over the third metatarsal, carried distally around each section of the third toe, re-converging on the bottom and through the ulceration present underneath the third metatarsal head.   The incision was deepened using both sharp and blunt

## 2017-03-22 NOTE — PROGRESS NOTES
[facility]  Progress Note     Chanell Antoniashruti Herman Patient Status:  Inpatient    1945 MRN [id number]   [facility]NW[facility]A [facility] Kalpana Hicks MD     Chief Complaint: Fever reviewed in Epic.     Medications:   • Atorvastatin Calcium  10 mg Oral Nightly   • gabapentin  600 mg Oral Nightly   • insulin detemir  10 Units Subcutaneous Nightly   • piperacillin-tazobactam  4.5 g Intravenous Q8H   • insulin aspart  1-5 Units Subcutane

## 2017-03-22 NOTE — CONSULTS
Hanane Gonzalez, [address] [facility]  [address]     VASCULAR SURGERY CONSULT NOTE      Name: Macrina Sutton   :   1945  [id number]     REFERRING PHYSICIAN: Jorge L Lobato MD  PRIMARY determined.     Nonvisualization of blood flow in the mid and distal aspect of the left posterior tibial artery.  Otherwise, blood flow present throughout the other major arterial structures of the right and left lower extremities, without evidence for krissy without mention of complication, not stated as uncontrolled    • Other and unspecified hyperlipidemia    • Visual disturbance      possible VF cut superior left eye   • Ischemic optic neuropathy    • BDR (background diabetic retinopathy)      w/out macular 650 mg, 650 mg, Oral, Q6H PRN  •  [MAR Hold] acetaminophen (TYLENOL) tab 650 mg, 650 mg, Oral, Q4H PRN **OR** [MAR Hold] HYDROcodone-acetaminophen (NORCO) 5-325 MG per tab 1 tablet, 1 tablet, Oral, Q4H PRN **OR** [MAR Hold] HYDROcodone-acetaminophen (NORCO (120.203 kg)  BMI 35.93 kg/m2  SpO2 97%  GENERAL: alert and orientated X 3, well developed, well nourished, in no apparent distress  NEURO/PSYCH: normal mood and affect  HEENT: ears and throat are clear  NECK: supple, no lymphadenopathy, thyroid wnl  CAROT surface of foot. FINDINGS:   Small plantar and dorsal calcaneal osteophytes are stable. Sequelae of resection of the distal phalange of the great toe is noted. Moderate to marked hallux valgus is again noted.   Folding of the second toe over the third t for mild osseous destructive change at the base of the second proximal phalanx. The findings are concerning for a septic arthritis and secondary osteomyelitis involving the base of the second proximal phalanx.   There is also abnormal degree of tenosynovial

## 2017-03-22 NOTE — PROGRESS NOTES
[facility]                INFECTIOUS DISEASE PROGRESS NOTE    Markus Sutton Patient Status:  Inpatient    1945 MRN [id number]   [facility] 3SW-A Attending Lauren King MD   Hosp Day # 2 PCP Aleta Youngblood MD     Occasional Gram Positive Rods      Tissue Aerobic Culture [[id number]] Collected: 03/21/17 1953     Order Status: Completed Lab Status: Preliminary result Updated: 03/22/17 1470     Specimen Information: Tissue from Toe       Tissue Smear Many Neutro

## 2017-03-22 NOTE — PLAN OF CARE
Diabetes/Glucose Control    • Glucose maintained within prescribed range Progressing        PAIN - ADULT    • Verbalizes/displays adequate comfort level or patient's stated pain goal Progressing        Patient/Family Goals    • Patient/Family [facility]

## 2017-03-22 NOTE — PROGRESS NOTES
Subjective: Patient is resting comfortably in bed his brother is present. He has no complaints of pain in the right foot. No complaints of pain in the left no complaints of fever or chills.    03/22/17  1146   BP: 109/66   Pulse: 77   Temp: 98.2 °F (36.8

## 2017-03-23 LAB
BLOOD TYPE BARCODE: 6200
BUN BLD-MCNC: 18 MG/DL (ref 8–20)
CREAT BLD-MCNC: 0.91 MG/DL (ref 0.7–1.3)
GLUCOSE BLD-MCNC: 107 MG/DL (ref 65–99)
GLUCOSE BLD-MCNC: 128 MG/DL (ref 65–99)
GLUCOSE BLD-MCNC: 129 MG/DL (ref 65–99)
GLUCOSE BLD-MCNC: 132 MG/DL (ref 65–99)
POTASSIUM SERPL-SCNC: 3.8 MMOL/L (ref 3.6–5.1)
VANCOMYCIN TROUGH: 13.4 UG/ML (ref 10–20)

## 2017-03-23 PROCEDURE — 99232 SBSQ HOSP IP/OBS MODERATE 35: CPT | Performed by: HOSPITALIST

## 2017-03-23 RX ORDER — POTASSIUM CHLORIDE 20 MEQ/1
40 TABLET, EXTENDED RELEASE ORAL ONCE
Status: COMPLETED | OUTPATIENT
Start: 2017-03-23 | End: 2017-03-23

## 2017-03-23 NOTE — CM/SW NOTE
Dr. Munir Sewell on unit. He is requesting pt be discharged to [address] [place] [address] for wound care. Will send referral via [facility] and ask SW to speak with pt regarding choice/plan.

## 2017-03-23 NOTE — PLAN OF CARE
Diabetes/Glucose Control    • Glucose maintained within prescribed range Progressing        PAIN - ADULT    • Verbalizes/displays adequate comfort level or patient's stated pain goal Progressing        Patient/Family Goals    • Patient/Family [facility]

## 2017-03-23 NOTE — PROGRESS NOTES
[facility]                INFECTIOUS DISEASE PROGRESS NOTE    Freddy Sutton Patient Status:  Inpatient    1945 MRN [id number]   [facility] 3SW-A Attending Hang oG MD   Hosp Day # 3 PCP Sammy Fagan MD 1052     Specimen Information: Tissue from Toe       Tissue Culture Result Light Staphylococcus aureus (A)       Tissue Smear Many Neutrophils seen        Occasional Gram Positive Cocci        Occasional Gram Positive Rods      Tissue Aerobic Culture [2003 Chronic bilateral low back pain without sciatica     Cellulitis of foot     Warfarin-induced coagulopathy (HCC)     Osteomyelitis of foot (HCC)      ASSESSMENT/PLAN:  1. 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

## 2017-03-23 NOTE — CM/SW NOTE
SW contacted DON screener and requested DON screen to be completed.     [place], [facility]

## 2017-03-23 NOTE — CONSULTS
[facility]  Report of Inpatient Wound Care Consultation     Davida Sutton Patient Status:  Inpatient    1945 MRN [id number]   [facility] 3SW-A Attending Dina Zarco,  [facility] Day # 3 PCP Mike Soto MD     RE hypertension    • Type II or unspecified type diabetes mellitus without mention of complication, not stated as uncontrolled    • Other and unspecified hyperlipidemia    • Visual disturbance      possible VF cut superior left eye   • Ischemic optic neuropat PLT  365.0   --    --    --   313.0   --    --    --    --    --    --    --    --    --    K  4.5   --    --    --   3.7   --    --    --   3.7   --    --   3.8   --    --    CREATSERUM  1.31*   --    --    --   0.93   --    --    --    --    --    -- Shimon removed 2 sutures on the (R) plantar foot wound, MD pleased with the status of the wound. MD would like to continue with use of NPWT. Explained process of NPWT to the wound to the patient, agreeable to continue.        Initiated NPWT to the (R) pl

## 2017-03-23 NOTE — PLAN OF CARE
Diabetes/Glucose Control    • Glucose maintained within prescribed range Progressing        PAIN - ADULT    • Verbalizes/displays adequate comfort level or patient's stated pain goal Progressing        Patient/Family Goals    • Patient/Family [facility]

## 2017-03-23 NOTE — CM/SW NOTE
SW met with patient and patient's brother, discussed that wound MD is recommending [facility] at discharge. Patient voiced understanding.  SW did discuss with patient that MD is recommending [facility] at discharge, however, did inform patient that choice of [facility] is th

## 2017-03-23 NOTE — PROGRESS NOTES
[facility]  Progress Note     Gaby Sutton Patient Status:  Inpatient    1945 MRN [id number]   [facility] 3NW-A [facility] PCP Quin Claude, MD     Chief Complaint: Fever input(s): TROP, CK in the last 72 hours. Imaging: Imaging data reviewed in Epic.     Medications:   • Potassium Chloride ER  40 mEq Oral Once   • Atorvastatin Calcium  10 mg Oral Nightly   • gabapentin  600 mg Oral Nightly   • insulin detemir  10 Un

## 2017-03-23 NOTE — PROGRESS NOTES
Patient is seen resting comfortably in bed in the supine position no complaints of pain, fever or chills. Most recent vital signs show temperature 97.7, pulse 90, respirations 18, blood pressure 130/94, SPO2 98%. He is stable he is afebrile.   Wound care

## 2017-03-24 ENCOUNTER — TELEPHONE (OUTPATIENT)
Dept: MEDSURG UNIT | Facility: HOSPITAL | Age: 72
End: 2017-03-24

## 2017-03-24 ENCOUNTER — APPOINTMENT (OUTPATIENT)
Dept: GENERAL RADIOLOGY | Facility: HOSPITAL | Age: 72
DRG: 617 | End: 2017-03-24
Attending: INTERNAL MEDICINE
Payer: MEDICARE

## 2017-03-24 LAB
BUN BLD-MCNC: 16 MG/DL (ref 8–20)
CREAT BLD-MCNC: 0.96 MG/DL (ref 0.7–1.3)
GLUCOSE BLD-MCNC: 109 MG/DL (ref 65–99)
GLUCOSE BLD-MCNC: 114 MG/DL (ref 65–99)
GLUCOSE BLD-MCNC: 134 MG/DL (ref 65–99)
GLUCOSE BLD-MCNC: 135 MG/DL (ref 65–99)
INR BLD: 1.25 (ref 0.89–1.11)
POTASSIUM SERPL-SCNC: 3.8 MMOL/L (ref 3.6–5.1)
PSA SERPL DL<=0.01 NG/ML-MCNC: 15.8 SECONDS (ref 12–14.3)

## 2017-03-24 PROCEDURE — 02HV33Z INSERTION OF INFUSION DEVICE INTO SUPERIOR VENA CAVA, PERCUTANEOUS APPROACH: ICD-10-PCS | Performed by: HOSPITALIST

## 2017-03-24 PROCEDURE — 71010 XR CHEST AP PORTABLE  (CPT=71010): CPT

## 2017-03-24 PROCEDURE — 99232 SBSQ HOSP IP/OBS MODERATE 35: CPT | Performed by: HOSPITALIST

## 2017-03-24 RX ORDER — POTASSIUM CHLORIDE 20 MEQ/1
40 TABLET, EXTENDED RELEASE ORAL ONCE
Status: COMPLETED | OUTPATIENT
Start: 2017-03-24 | End: 2017-03-24

## 2017-03-24 RX ORDER — SODIUM CHLORIDE 0.9 % (FLUSH) 0.9 %
10 SYRINGE (ML) INJECTION AS NEEDED
Status: DISCONTINUED | OUTPATIENT
Start: 2017-03-24 | End: 2017-03-25

## 2017-03-24 NOTE — CM/SW NOTE
sw notified by Cathi Petersen of [facility] that they can accept pt today and will order the wound vac today but it will not arrive until Saturday, she also confirms it will be placed Saturday. They are willing to accept pt today.  Will need to confirm with MD Palacios

## 2017-03-24 NOTE — PLAN OF CARE
Diabetes/Glucose Control    • Glucose maintained within prescribed range Progressing        PAIN - ADULT    • Verbalizes/displays adequate comfort level or patient's stated pain goal Progressing        Patient/Family Goals    • Patient/Family [facility]

## 2017-03-24 NOTE — PROGRESS NOTES
[facility] HOSPITALIST  Progress Note     Clovis Sutton Patient Status:  Inpatient    1945 MRN [id number]   [facility] 3NW-A Attending [facility] Day # 4 PCP Sanna Lewis MD     Chief Complaint: Fever Daily   • Warfarin Sodium  2.5 mg Oral Once per day on Fri   • Warfarin Sodium  5 mg Oral Once per day on Sun Mon Tue Wed Thu Sat       ASSESSMENT / PLAN:     1.  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

## 2017-03-24 NOTE — PLAN OF CARE
Diabetes/Glucose Control    • Glucose maintained within prescribed range Progressing        PAIN - ADULT    • Verbalizes/displays adequate comfort level or patient's stated pain goal Progressing        Patient/Family Goals    • Patient/Family [facility]

## 2017-03-24 NOTE — CM/SW NOTE
sw notified by RN that Dr Denise Calderon does not want pt discharge until Saturday as they wound vac will not be ready at [facility] today. sw notified fernando of [facility]. Saturday sw to follow up.  Per Dr Denise Calderon pt is only to go to [facility] when th

## 2017-03-24 NOTE — CM/SW NOTE
Call placed to fernando trejo [facility] to notify them of likely dc today and that pt will need a wound vac and iv abx. Awaiting call back to confirm.  sw to follow

## 2017-03-24 NOTE — PROGRESS NOTES
[facility]                INFECTIOUS DISEASE PROGRESS NOTE    Markus Sutton Patient Status:  Inpatient    1945 MRN [id number]   [facility] 3SW-A Attending Lauren King MD   Hosp Day # 4 PCP Aleta Youngblood MD   Oxacillin 0.5  Sensitive      Penicillin =0.5 \" class=\"[id number]\" style=\"padding-right: 1em;\">>=0.5  Resistant      Trimethoprim/Sulfa <=10  Sensitive      Vancomycin <=0.5  Sensitive                        Tissue Aerobic Culture [[id number]] (Abnormal) Chronic atrial fibrillation (HCC)     Benign neoplasm of colon     Hypertrophy of prostate without urinary obstruction and other lower urinary tract symptoms (LUTS)     Hyperlipidemia     DM2 (diabetes mellitus, type 2) (HCC)     PAD (peripheral artery

## 2017-03-24 NOTE — PROGRESS NOTES
[facility] dosing service    Follow-up Pharmacokinetic Consult for Vancomycin Dosing     Bronwyn Baez is a 66year old male admitted on 3/20 who is being treated for cellulitis, possible osteomyelitis.    Patient is on day 3 of Vancomycin 1.5 gm IV reactive bone marrow edematous changes within the second metatarsal head as well as within the base of the second proximal phalanx.  There are however also to be mild osseous destructive changes at the base of the second proximal phalanges   concerning for

## 2017-03-24 NOTE — PROGRESS NOTES
Spoke with Dr Sherrie Bernabe, notified of wound vac delivery arrangements for nursing facility.  Physician agreeable to discharge 3/25/17 as long as there is someone at [facility] tomorrow that can reapply the wound vac on patient arrival. Per facility and case managemen

## 2017-03-24 NOTE — PROGRESS NOTES
Wound vac alarming for occluded tubing. Attempted trouble shooting measures- tubing flushed, checked for blockages and kinks. Remains alarming for occlusion. Paged and discussed with wound care, will evaluate machine and adjust dressing if needed.

## 2017-03-25 VITALS
BODY MASS INDEX: 35.89 KG/M2 | WEIGHT: 265 LBS | HEART RATE: 65 BPM | DIASTOLIC BLOOD PRESSURE: 60 MMHG | RESPIRATION RATE: 18 BRPM | SYSTOLIC BLOOD PRESSURE: 137 MMHG | OXYGEN SATURATION: 95 % | HEIGHT: 72 IN | TEMPERATURE: 98 F

## 2017-03-25 LAB
GLUCOSE BLD-MCNC: 115 MG/DL (ref 65–99)
GLUCOSE BLD-MCNC: 132 MG/DL (ref 65–99)
INR BLD: 1.27 (ref 0.89–1.11)
POTASSIUM SERPL-SCNC: 3.9 MMOL/L (ref 3.6–5.1)
PSA SERPL DL<=0.01 NG/ML-MCNC: 16 SECONDS (ref 12–14.3)

## 2017-03-25 PROCEDURE — 99239 HOSP IP/OBS DSCHRG MGMT >30: CPT | Performed by: HOSPITALIST

## 2017-03-25 NOTE — PLAN OF CARE
PAIN - ADULT    • Verbalizes/displays adequate comfort level or patient's stated pain goal Progressing        SAFETY ADULT - FALL    • Free from fall injury Progressing        SKIN/TISSUE INTEGRITY - ADULT    • Incision(s), wounds(s) or drain site(s) heali

## 2017-03-25 NOTE — PLAN OF CARE
Upon AM report, writer informed that wound vac not functioning properly throughout the night, alarming \"negative pressure\" and \"low pressure\" alarms among others that were not resolved with multiple troubleshooting attempts.  Posted wound vac guide cons

## 2017-03-25 NOTE — PLAN OF CARE
Diabetes/Glucose Control    • Glucose maintained within prescribed range Adequate for Discharge        PAIN - ADULT    • Verbalizes/displays adequate comfort level or patient's stated pain goal Adequate for Discharge        Patient/Family Goals    • Patien

## 2017-03-25 NOTE — CM/SW NOTE
03/25/17 1300   Discharge disposition   Discharged to: Skilled Nurs   Name of [address]   Discharge transportation [employer]  ([id number])     [facility]  Z:[phone]

## 2017-03-25 NOTE — PROGRESS NOTES
[facility] H[facility]PITALIST  Progress Note     Radamse Sutton Patient Status:  Inpatient    1945 MRN [id number]   [facility] 3NW-A Attending [facility] Day # 5 PCP Luz Elena Espitia MD     Chief Complaint: Fever • Warfarin Sodium  2.5 mg Oral Once per day on Fri   • Warfarin Sodium  5 mg Oral Once per day on Sun Mon Tue Wed Thu Sat       ASSESSMENT / PLAN:     1.  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

## 2017-03-25 NOTE — PLAN OF CARE
Wound vac available today for set up at [facility] per SW, plan for 1500 DC. Dr. Santosh Martin notified and OK to DC will review AVS DC meds.

## 2017-03-25 NOTE — CM/SW NOTE
Final dc orders received. [facility] confirms they have pts wound vac delivered. [facility] arranged for 3pm, PCS form completed. RN to call report to [phone].      Plan: pt to dc today at 3pm via [facility] to [place] [id number] [facility]

## 2017-03-25 NOTE — PLAN OF CARE
Pt denies pain , remains with wound vac to right foot with tegaderm reinforced for lack of suction with success. Dressing reapplied. Left foot dressing intact no drainage noted. Sat 94% on ra lungs clear bs present. Report given to oncoming rn.

## 2017-03-26 NOTE — DISCHARGE SUMMARY
[facility] HOSPITALIST  DISCHARGE SUMMARY     Akash Sutton Patient Status:  Inpatient    1945 MRN [id number]   [facility] 3SW-A Attending No att. providers found   [facility] Day # 5 PCP Cari Vick MD     Date of Admission: 3/ tingling the upper extremities.  Patient does have neuropathy in his lower extremities.  No headache, sinus/nasal congestion, or cough.  No hematochezia, melena, hematuria, hemoptysis, or hematemesis.     Brief Synopsis: Patient was admitted with complaints Warfarin Sodium 5 MG Tabs   Last time this was given:  2.5 mg on 3/24/2017  9:54 PM   Commonly known as:  COUMADIN   What changed:    - how much to take  - how to take this  - when to take this  - additional instructions   Notes to Patient:  M/T/W/Th/Sa Refills:  0       Vitamin C 500 MG Caps        Take 1 Cap by mouth daily.     Refills:  0            Where to Get Your Medications      Please  your prescriptions at the location directed by your doctor or nurse     Bring a paper prescription for eac

## 2017-03-26 NOTE — PLAN OF CARE
Call to [facility] to inform patient discharged and wound vac ready for , informed that wound vac requested in AM was never used. Spoke to [place] at [facility].  [employer] will  used wound vac from 3SW RN charge nurse station and ok to return unused wou

## 2017-03-27 ENCOUNTER — SNF VISIT (OUTPATIENT)
Dept: INTERNAL MEDICINE CLINIC | Age: 72
End: 2017-03-27

## 2017-03-27 ENCOUNTER — NURSE ONLY (OUTPATIENT)
Dept: LAB | Age: 72
End: 2017-03-27
Attending: FAMILY MEDICINE
Payer: MEDICARE

## 2017-03-27 VITALS — OXYGEN SATURATION: 97 % | HEART RATE: 74 BPM

## 2017-03-27 DIAGNOSIS — Z79.4 TYPE 2 DIABETES MELLITUS WITH DIABETIC POLYNEUROPATHY, WITH LONG-TERM CURRENT USE OF INSULIN (HCC): ICD-10-CM

## 2017-03-27 DIAGNOSIS — M86.9 OSTEOMYELITIS OF RIGHT FOOT, UNSPECIFIED TYPE (HCC): ICD-10-CM

## 2017-03-27 DIAGNOSIS — Z79.01 LONG TERM (CURRENT) USE OF ANTICOAGULANTS: Primary | ICD-10-CM

## 2017-03-27 DIAGNOSIS — I10 ESSENTIAL HYPERTENSION: ICD-10-CM

## 2017-03-27 DIAGNOSIS — R53.1 WEAKNESS: ICD-10-CM

## 2017-03-27 DIAGNOSIS — R69 ILLNESS: ICD-10-CM

## 2017-03-27 DIAGNOSIS — E11.42 TYPE 2 DIABETES MELLITUS WITH DIABETIC POLYNEUROPATHY, WITH LONG-TERM CURRENT USE OF INSULIN (HCC): ICD-10-CM

## 2017-03-27 DIAGNOSIS — I48.91 ATRIAL FIBRILLATION, UNSPECIFIED TYPE (HCC): ICD-10-CM

## 2017-03-27 DIAGNOSIS — I73.9 PAD (PERIPHERAL ARTERY DISEASE) (HCC): ICD-10-CM

## 2017-03-27 DIAGNOSIS — E78.2 MIXED HYPERLIPIDEMIA: ICD-10-CM

## 2017-03-27 DIAGNOSIS — R26.9 GAIT ABNORMALITY: ICD-10-CM

## 2017-03-27 DIAGNOSIS — Z89.421 S/P AMPUTATION OF LESSER TOE, RIGHT (HCC): Primary | ICD-10-CM

## 2017-03-27 LAB
ALBUMIN SERPL-MCNC: 2.8 G/DL (ref 3.5–4.8)
ALP LIVER SERPL-CCNC: 34 U/L (ref 45–117)
ALT SERPL-CCNC: 28 U/L (ref 17–63)
AST SERPL-CCNC: 29 U/L (ref 15–41)
BASOPHILS # BLD AUTO: 0.12 X10(3) UL (ref 0–0.1)
BASOPHILS NFR BLD AUTO: 1.4 %
BILIRUB SERPL-MCNC: 0.3 MG/DL (ref 0.1–2)
BUN BLD-MCNC: 16 MG/DL (ref 8–20)
CALCIUM BLD-MCNC: 9 MG/DL (ref 8.3–10.3)
CHLORIDE: 105 MMOL/L (ref 101–111)
CO2: 28 MMOL/L (ref 22–32)
CREAT BLD-MCNC: 1.08 MG/DL (ref 0.7–1.3)
EOSINOPHIL # BLD AUTO: 0.28 X10(3) UL (ref 0–0.3)
EOSINOPHIL NFR BLD AUTO: 3.2 %
ERYTHROCYTE [DISTWIDTH] IN BLOOD BY AUTOMATED COUNT: 13 % (ref 11.5–16)
GLUCOSE BLD-MCNC: 102 MG/DL (ref 70–99)
HCT VFR BLD AUTO: 45.9 % (ref 37–53)
HGB BLD-MCNC: 15.2 G/DL (ref 13–17)
IMMATURE GRANULOCYTE COUNT: 0.07 X10(3) UL (ref 0–1)
IMMATURE GRANULOCYTE RATIO %: 0.8 %
INR BLD: 1.41 (ref 0.89–1.11)
LYMPHOCYTES # BLD AUTO: 1.16 X10(3) UL (ref 0.9–4)
LYMPHOCYTES NFR BLD AUTO: 13.3 %
M PROTEIN MFR SERPL ELPH: 7.3 G/DL (ref 6.1–8.3)
MCH RBC QN AUTO: 31.8 PG (ref 27–33.2)
MCHC RBC AUTO-ENTMCNC: 33.1 G/DL (ref 31–37)
MCV RBC AUTO: 96 FL (ref 80–99)
MONOCYTES # BLD AUTO: 0.86 X10(3) UL (ref 0.1–0.6)
MONOCYTES NFR BLD AUTO: 9.8 %
NEUTROPHIL ABS PRELIM: 6.25 X10 (3) UL (ref 1.3–6.7)
NEUTROPHILS # BLD AUTO: 6.25 X10(3) UL (ref 1.3–6.7)
NEUTROPHILS NFR BLD AUTO: 71.5 %
PLATELET # BLD AUTO: 447 10(3)UL (ref 150–450)
POTASSIUM SERPL-SCNC: 4 MMOL/L (ref 3.6–5.1)
PSA SERPL DL<=0.01 NG/ML-MCNC: 17.4 SECONDS (ref 12–14.3)
RBC # BLD AUTO: 4.78 X10(6)UL (ref 3.8–5.8)
RED CELL DISTRIBUTION WIDTH-SD: 46.2 FL (ref 35.1–46.3)
SODIUM SERPL-SCNC: 141 MMOL/L (ref 136–144)
WBC # BLD AUTO: 8.7 X10(3) UL (ref 4–13)

## 2017-03-27 PROCEDURE — 85610 PROTHROMBIN TIME: CPT

## 2017-03-27 PROCEDURE — 36415 COLL VENOUS BLD VENIPUNCTURE: CPT

## 2017-03-27 PROCEDURE — 85025 COMPLETE CBC W/AUTO DIFF WBC: CPT

## 2017-03-27 PROCEDURE — 80053 COMPREHEN METABOLIC PANEL: CPT

## 2017-03-27 PROCEDURE — 99310 SBSQ NF CARE HIGH MDM 45: CPT | Performed by: NURSE PRACTITIONER

## 2017-03-27 RX ORDER — ZINC SULFATE 50(220)MG
220 CAPSULE ORAL DAILY
COMMUNITY
Start: 2017-03-27 | End: 2017-04-10

## 2017-03-27 NOTE — PROGRESS NOTES
Butch Sutton  : 1945  Age 66year old  male patient is admitted to Facility: [place] [address] for HAILEY s/p amputation right 3rd toe w/ I&D.     [address] [place] [address] Drive date:    3.20.17  Discharge date to [facility]:    3.25.17  ELOS:    Not hyperhidrosis    • Benign neoplasm of colon    • S/P amputation of lesser toe (HCC)    • Tonsillitis    • Appendicitis    • Internal hemorrhoids    • Arm pain      pain ini  right biceps   • Neuropathy (HCC)    • Hammertoe    • Bunion    • Onychomycosis Current Outpatient Prescriptions:  zinc sulfate 220 MG Oral Cap Take 220 mg by mouth daily. Disp:  Rfl:    CeFAZolin Sodium Intravenous Solution Inject 100 mL (2 g total) into the vein every 8 (eight) hours.  Disp: 6600 mL Rfl: 0   gabapentin 300 MG Ora CARDIOVASCULAR:denies chest pain, no palpitations , denies syncope, denies orthopnea, denies cough  GI: denies nausea, vomiting, constipation, diarrhea; no rectal bleeding; no heartburn  :no dysuria, urgency or frequency; no epididymal or testicular pa PVD  NEUROLOGIC: intact; no sensorimotor deficit, cranial nerves intact II-XII, follows commands  PSYCHIATRIC: alert and oriented x 3; affect appropriate      MEDICAL DECISION MAKING  PMH significant for CVA, A Fib, HTN, HL, PAD, T2 DM w/ PN, BPH, and LBP- reconciliation completed. SEE PLAN BELOW  S/P amputation 3rd right toe and metatarsal head/gas gangrene/early OM  1. NWB to RLE and mild WB to LLE w/ walker assist for transfers and to sit  2.  Keep feet elevated above hip level as often as possible

## 2017-03-29 ENCOUNTER — SNF VISIT (OUTPATIENT)
Dept: INTERNAL MEDICINE CLINIC | Age: 72
End: 2017-03-29

## 2017-03-29 VITALS — OXYGEN SATURATION: 97 % | HEART RATE: 94 BPM

## 2017-03-29 DIAGNOSIS — M86.9 OSTEOMYELITIS OF RIGHT FOOT, UNSPECIFIED TYPE (HCC): ICD-10-CM

## 2017-03-29 DIAGNOSIS — R53.1 WEAKNESS: ICD-10-CM

## 2017-03-29 DIAGNOSIS — Z89.421 S/P AMPUTATION OF LESSER TOE, RIGHT (HCC): Primary | ICD-10-CM

## 2017-03-29 DIAGNOSIS — R26.9 GAIT ABNORMALITY: ICD-10-CM

## 2017-03-29 PROCEDURE — 99308 SBSQ NF CARE LOW MDM 20: CPT | Performed by: NURSE PRACTITIONER

## 2017-03-29 NOTE — PROGRESS NOTES
Rafael Cuba [facility], 12/12/1945, 66year old, male    Chief Complaint:  Patient presents with:   Follow - Up: right foot cellulitis/mild OM s/p amputation of right 3rd toe and I&D on 3.21.17  Weakness       Subjective:  PMH significant for CVA, A Fib, HTN, H w/ skin changes c/w PVD  NEUROLOGIC: intact; no sensorimotor deficit, cranial nerves intact II-XII, follows commands  PSYCHIATRIC: alert and oriented x 3; affect appropriate      Medications reviewed: Yes    Diagnostics reviewed:     Accu check readings rev 3. 3.17    LAURA House  3/29/2017  10:30 AM

## 2017-03-30 ENCOUNTER — NURSE ONLY (OUTPATIENT)
Dept: LAB | Age: 72
End: 2017-03-30
Attending: FAMILY MEDICINE
Payer: MEDICARE

## 2017-03-30 DIAGNOSIS — I48.91 A-FIB (HCC): Primary | ICD-10-CM

## 2017-03-30 PROCEDURE — 80162 ASSAY OF DIGOXIN TOTAL: CPT

## 2017-03-30 PROCEDURE — 36415 COLL VENOUS BLD VENIPUNCTURE: CPT

## 2017-03-30 PROCEDURE — 85610 PROTHROMBIN TIME: CPT

## 2017-03-31 ENCOUNTER — SNF ADMIT/H&P (OUTPATIENT)
Dept: FAMILY MEDICINE CLINIC | Facility: CLINIC | Age: 72
End: 2017-03-31

## 2017-03-31 ENCOUNTER — SNF VISIT (OUTPATIENT)
Dept: INTERNAL MEDICINE CLINIC | Age: 72
End: 2017-03-31

## 2017-03-31 VITALS
RESPIRATION RATE: 18 BRPM | SYSTOLIC BLOOD PRESSURE: 136 MMHG | OXYGEN SATURATION: 97 % | DIASTOLIC BLOOD PRESSURE: 68 MMHG | TEMPERATURE: 98 F | HEART RATE: 72 BPM

## 2017-03-31 DIAGNOSIS — I48.91 ATRIAL FIBRILLATION, UNSPECIFIED TYPE (HCC): ICD-10-CM

## 2017-03-31 DIAGNOSIS — R53.1 GENERALIZED WEAKNESS: ICD-10-CM

## 2017-03-31 DIAGNOSIS — M86.9 OSTEOMYELITIS OF RIGHT FOOT, UNSPECIFIED TYPE (HCC): ICD-10-CM

## 2017-03-31 DIAGNOSIS — Z89.421 S/P AMPUTATION OF LESSER TOE, RIGHT (HCC): Primary | ICD-10-CM

## 2017-03-31 DIAGNOSIS — G89.29 CHRONIC BILATERAL LOW BACK PAIN WITHOUT SCIATICA: ICD-10-CM

## 2017-03-31 DIAGNOSIS — I73.9 PAD (PERIPHERAL ARTERY DISEASE) (HCC): ICD-10-CM

## 2017-03-31 DIAGNOSIS — I10 ESSENTIAL HYPERTENSION: ICD-10-CM

## 2017-03-31 DIAGNOSIS — M54.50 CHRONIC BILATERAL LOW BACK PAIN WITHOUT SCIATICA: ICD-10-CM

## 2017-03-31 DIAGNOSIS — E78.00 PURE HYPERCHOLESTEROLEMIA: ICD-10-CM

## 2017-03-31 DIAGNOSIS — Z89.429 S/P AMPUTATION OF LESSER TOE, UNSPECIFIED LATERALITY (HCC): ICD-10-CM

## 2017-03-31 DIAGNOSIS — I48.20 CHRONIC ATRIAL FIBRILLATION (HCC): Primary | ICD-10-CM

## 2017-03-31 DIAGNOSIS — N40.0 BENIGN NON-NODULAR PROSTATIC HYPERPLASIA WITHOUT LOWER URINARY TRACT SYMPTOMS: ICD-10-CM

## 2017-03-31 DIAGNOSIS — M00.9 SEPTIC ARTHRITIS OF RIGHT FOOT, DUE TO UNSPECIFIED ORGANISM (HCC): ICD-10-CM

## 2017-03-31 DIAGNOSIS — E11.00 TYPE 2 DIABETES MELLITUS WITH HYPEROSMOLARITY WITHOUT COMA, WITHOUT LONG-TERM CURRENT USE OF INSULIN (HCC): ICD-10-CM

## 2017-03-31 DIAGNOSIS — D12.6 BENIGN NEOPLASM OF COLON, UNSPECIFIED PART OF COLON: ICD-10-CM

## 2017-03-31 DIAGNOSIS — R79.1 SUBTHERAPEUTIC INTERNATIONAL NORMALIZED RATIO (INR): ICD-10-CM

## 2017-03-31 DIAGNOSIS — Z79.01 LONG TERM (CURRENT) USE OF ANTICOAGULANTS: ICD-10-CM

## 2017-03-31 PROCEDURE — 99306 1ST NF CARE HIGH MDM 50: CPT | Performed by: FAMILY MEDICINE

## 2017-03-31 PROCEDURE — 99309 SBSQ NF CARE MODERATE MDM 30: CPT | Performed by: NURSE PRACTITIONER

## 2017-03-31 RX ORDER — WARFARIN SODIUM 6 MG/1
6 TABLET ORAL DAILY
COMMUNITY
End: 2017-04-07 | Stop reason: DRUGHIGH

## 2017-03-31 NOTE — PROGRESS NOTES
Gonzalezjason Megha [facility], 12/12/1945, 66year old, male    Chief Complaint:  Patient presents with:  Anticoagulation       Subjective:  PMH significant for CVA, A Fib, HTN, HL, PAD, T2 DM w/ PN, BPH, and LBP.  In HAILEY s/p right foot cellulitis/mild OM s/p amputat have any sensation to feet.   NEUROLOGIC: intact; no sensorimotor deficit, cranial nerves intact II-XII, follows commands  PSYCHIATRIC: alert and oriented x 3; affect appropriate      Medications reviewed: Yes    Diagnostics reviewed:      Lab Results  Comp prn  8. Dig level=0.83 on 3.30.17    HL  1. Lovastatin 40 mg q HS    T2 DM w/ PN  1. CHO controlled diet  2. Accu check BID; notify if <70 or >400  3. Glimepiride 2 mg daily  4. Basaglar 11u q HS  5. Gabapentin 300 mg q AM and 600 mg q HS  6.  Last A1C 6.5%

## 2017-04-01 NOTE — PROGRESS NOTES
[facility] Author: Anastasia Cheadle, MD     1945 MRN [id number]   [facility]  Admission 3/20/17      Last Hospital Discharge 3/25/17 Pavan Al of Discharge 3/25/17       Date of A pain and pain's under control with feet raised and non weight bearing. Patient will be followed by both ID and Podiatry, will require PT/OT/ST and wound care along with specialist. Patient reports no complaints and pain's under control.    Patient denies a Tubular adenoma; Epistaxis; Ischemic optic neuropathy; Obesity; BPH (benign prostatic hypertrophy); Polyneuropathy in diabetes ([facility] Chest pain; Generalized hyperhidrosis; Benign neoplasm of colon; S/P amputation of lesser toe ([facility] 75.); Tonsillitis;  Appendic is oriented to person, place, and time. He appears well-developed and well-nourished. No distress. HENT:   Head: Normocephalic and atraumatic. Nose: Nose normal.   Mouth/Throat: No oropharyngeal exudate.    Eyes: Conjunctivae and EOM are normal. Right e (CPT=73630), 11/30/2013, 14:24. INDICATIONS:  R foot diabetic ulcer  PATIENT STATED HISTORY: (As transcribed by Technologist)  Right foot diabetic ulcer plantar surface of foot. FINDINGS:   Small plantar and dorsal calcaneal osteophytes are stable.   Se imaging planes and parameters were utilized for visualization of suspected pathology. Images were performed without contrast.  FINDINGS:  There is a transverse surface skin ulceration noted superficial to the second MTP J.  The size of the ulcerations appr placement confirmation  PATIENT STATED HISTORY: (As transcribed by Technologist)  Patient provided no additional history. FINDINGS:  Mild calcified plaque in the thoracic aorta. Minimal scarring/atelectasis in the lower lungs.  Degenerative changes in th ([facility]  -continue oral and insulin, stable, watching. 12. Pure hypercholesterolemia  -continue with statin.      13. Benign non-nodular prostatic hyperplasia without lower urinary tract symptoms  -stable, CPM.     Macrina Sutton needs then following

## 2017-04-03 ENCOUNTER — OFFICE VISIT (OUTPATIENT)
Dept: WOUND CARE | Facility: HOSPITAL | Age: 72
End: 2017-04-03
Attending: PODIATRIST
Payer: MEDICARE

## 2017-04-03 ENCOUNTER — SNF VISIT (OUTPATIENT)
Dept: INTERNAL MEDICINE CLINIC | Age: 72
End: 2017-04-03

## 2017-04-03 ENCOUNTER — NURSE ONLY (OUTPATIENT)
Dept: LAB | Age: 72
End: 2017-04-03
Attending: FAMILY MEDICINE
Payer: MEDICARE

## 2017-04-03 VITALS — HEART RATE: 68 BPM | OXYGEN SATURATION: 97 %

## 2017-04-03 DIAGNOSIS — E11.621 TYPE 2 DIABETES MELLITUS WITH FOOT ULCER (HCC): ICD-10-CM

## 2017-04-03 DIAGNOSIS — R69 ILLNESS: ICD-10-CM

## 2017-04-03 DIAGNOSIS — Z79.01 LONG TERM (CURRENT) USE OF ANTICOAGULANTS: Primary | ICD-10-CM

## 2017-04-03 DIAGNOSIS — I48.91 ATRIAL FIBRILLATION, UNSPECIFIED TYPE (HCC): ICD-10-CM

## 2017-04-03 DIAGNOSIS — L97.512 RIGHT FOOT ULCER, WITH FAT LAYER EXPOSED (HCC): ICD-10-CM

## 2017-04-03 DIAGNOSIS — L97.509 TYPE 2 DIABETES MELLITUS WITH FOOT ULCER (HCC): ICD-10-CM

## 2017-04-03 DIAGNOSIS — Z89.421 S/P AMPUTATION OF LESSER TOE, RIGHT (HCC): Primary | ICD-10-CM

## 2017-04-03 DIAGNOSIS — M86.9 OSTEOMYELITIS OF RIGHT FOOT, UNSPECIFIED TYPE (HCC): ICD-10-CM

## 2017-04-03 DIAGNOSIS — L97.522 NON-PRESSURE CHRONIC ULCER OF OTHER PART OF LEFT FOOT WITH FAT LAYER EXPOSED (HCC): ICD-10-CM

## 2017-04-03 DIAGNOSIS — L97.919 NON-PRESSURE CHRONIC ULCER OF UNSPECIFIED PART OF RIGHT LOWER LEG WITH UNSPECIFIED SEVERITY (HCC): Primary | ICD-10-CM

## 2017-04-03 PROCEDURE — 85652 RBC SED RATE AUTOMATED: CPT

## 2017-04-03 PROCEDURE — 11042 DBRDMT SUBQ TIS 1ST 20SQCM/<: CPT

## 2017-04-03 PROCEDURE — 99308 SBSQ NF CARE LOW MDM 20: CPT | Performed by: NURSE PRACTITIONER

## 2017-04-03 PROCEDURE — 80053 COMPREHEN METABOLIC PANEL: CPT

## 2017-04-03 PROCEDURE — 97597 DBRDMT OPN WND 1ST 20 CM/<: CPT

## 2017-04-03 PROCEDURE — 86140 C-REACTIVE PROTEIN: CPT

## 2017-04-03 PROCEDURE — 36415 COLL VENOUS BLD VENIPUNCTURE: CPT

## 2017-04-03 PROCEDURE — 85025 COMPLETE CBC W/AUTO DIFF WBC: CPT

## 2017-04-03 NOTE — PROGRESS NOTES
Savanna Gleason Herman, 12/12/1945, 66year old, male    Chief Complaint:  Patient presents with:   Follow - Up: s/p amputation 3rd right toe and I&D on 3.21.17       Subjective:  PMH significant for CVA, A Fib, HTN, HL, PAD, T2 DM w/ PN, BPH, and LBP.  In HAILEY pulses 2+, RLE w/ skin changes c/w PVD. C and M intact b/l but d/t PN does not have any sensation to feet.   NEUROLOGIC: intact; no sensorimotor deficit, cranial nerves intact II-XII, follows commands  PSYCHIATRIC: alert and oriented x 3; affect appropriat 12-20 days  4. Plan DC on or before 4.17.17 (day after IV abx complete).   May go earlier if IV abx are able to be done at home    A Fib/HTN/PAD  1. VS q shift  2. Digoxin 250 mcg daily  3. Lisinopril 2.5 mg daily  4. Metoprolol 100 mg daily  5. Warfarin 7.

## 2017-04-04 ENCOUNTER — NURSE ONLY (OUTPATIENT)
Dept: LAB | Age: 72
End: 2017-04-04
Attending: FAMILY MEDICINE
Payer: MEDICARE

## 2017-04-04 DIAGNOSIS — Z79.01 WARFARIN ANTICOAGULATION: Primary | ICD-10-CM

## 2017-04-04 PROCEDURE — 36415 COLL VENOUS BLD VENIPUNCTURE: CPT

## 2017-04-04 PROCEDURE — 85610 PROTHROMBIN TIME: CPT

## 2017-04-06 ENCOUNTER — TELEPHONE (OUTPATIENT)
Dept: INTERNAL MEDICINE CLINIC | Facility: CLINIC | Age: 72
End: 2017-04-06

## 2017-04-06 NOTE — TELEPHONE ENCOUNTER
Patient wanted Dr Myles Schmidt to know that he is currently in [facility] after a toe amputation.

## 2017-04-07 ENCOUNTER — SNF VISIT (OUTPATIENT)
Dept: INTERNAL MEDICINE CLINIC | Age: 72
End: 2017-04-07

## 2017-04-07 ENCOUNTER — NURSE ONLY (OUTPATIENT)
Dept: LAB | Age: 72
End: 2017-04-07
Attending: FAMILY MEDICINE
Payer: MEDICARE

## 2017-04-07 VITALS
OXYGEN SATURATION: 96 % | SYSTOLIC BLOOD PRESSURE: 130 MMHG | DIASTOLIC BLOOD PRESSURE: 76 MMHG | TEMPERATURE: 98 F | HEART RATE: 70 BPM | RESPIRATION RATE: 20 BRPM

## 2017-04-07 DIAGNOSIS — Z89.421 S/P AMPUTATION OF LESSER TOE, RIGHT (HCC): Primary | ICD-10-CM

## 2017-04-07 DIAGNOSIS — I48.91 ATRIAL FIBRILLATION, UNSPECIFIED TYPE (HCC): ICD-10-CM

## 2017-04-07 DIAGNOSIS — Z79.4 TYPE 2 DIABETES MELLITUS WITH DIABETIC POLYNEUROPATHY, WITH LONG-TERM CURRENT USE OF INSULIN (HCC): ICD-10-CM

## 2017-04-07 DIAGNOSIS — M86.9 OSTEOMYELITIS OF RIGHT FOOT, UNSPECIFIED TYPE (HCC): ICD-10-CM

## 2017-04-07 DIAGNOSIS — E11.42 TYPE 2 DIABETES MELLITUS WITH DIABETIC POLYNEUROPATHY, WITH LONG-TERM CURRENT USE OF INSULIN (HCC): ICD-10-CM

## 2017-04-07 PROCEDURE — 85610 PROTHROMBIN TIME: CPT

## 2017-04-07 PROCEDURE — 36415 COLL VENOUS BLD VENIPUNCTURE: CPT

## 2017-04-07 PROCEDURE — 99309 SBSQ NF CARE MODERATE MDM 30: CPT | Performed by: NURSE PRACTITIONER

## 2017-04-07 RX ORDER — WARFARIN SODIUM 5 MG/1
5 TABLET ORAL NIGHTLY
COMMUNITY
End: 2017-09-11

## 2017-04-07 NOTE — PROGRESS NOTES
Josiane Contreras Herman, 12/12/1945, 66year old, male    Chief Complaint:  Patient presents with:   Follow - Up: s/p gas gangrene and amputation 3rd right toe and I&D on 3.21.17  Anticoagulation       Subjective:  PMH significant for CVA, A Fib, HTN, HL, PAD, T lymphedema  MUSCULOSKELETAL: no acute synovitis upper or lower extremity  EXTREMITIES/VASCULAR:no cyanosis, or clubbing, or edema to distal RLE, radial pulses 2+ and dorsalis pedal pulses 2+, RLE w/ skin changes c/w PVD.   C and M intact b/l but d/t PN does daily  4. Metoprolol 100 mg daily  5. Warfarin 5 mg nightly   6. PT/INR q 3 days while on abx  7. CMP prn  8. Dig level=0.83 on 3.30.17    HL  1. Lovastatin 40 mg q HS    T2 DM w/ PN  1. CHO controlled diet  2. Accu check BID; notify if <70 or >400  3.  Gli

## 2017-04-10 ENCOUNTER — NURSE ONLY (OUTPATIENT)
Dept: LAB | Age: 72
End: 2017-04-10
Attending: FAMILY MEDICINE
Payer: MEDICARE

## 2017-04-10 ENCOUNTER — OFFICE VISIT (OUTPATIENT)
Dept: WOUND CARE | Facility: HOSPITAL | Age: 72
End: 2017-04-10
Attending: PODIATRIST
Payer: MEDICARE

## 2017-04-10 DIAGNOSIS — L97.919 NON-PRESSURE CHRONIC ULCER OF UNSPECIFIED PART OF RIGHT LOWER LEG WITH UNSPECIFIED SEVERITY (HCC): Primary | ICD-10-CM

## 2017-04-10 DIAGNOSIS — M62.81 MUSCLE WEAKNESS: ICD-10-CM

## 2017-04-10 DIAGNOSIS — L97.509 TYPE 2 DIABETES MELLITUS WITH FOOT ULCER (HCC): ICD-10-CM

## 2017-04-10 DIAGNOSIS — Z79.01 LONG TERM (CURRENT) USE OF ANTICOAGULANTS: ICD-10-CM

## 2017-04-10 DIAGNOSIS — E11.621 TYPE 2 DIABETES MELLITUS WITH FOOT ULCER (HCC): ICD-10-CM

## 2017-04-10 DIAGNOSIS — L97.522 NON-PRESSURE CHRONIC ULCER OF OTHER PART OF LEFT FOOT WITH FAT LAYER EXPOSED (HCC): ICD-10-CM

## 2017-04-10 DIAGNOSIS — L03.90 CELLULITIS: Primary | ICD-10-CM

## 2017-04-10 PROCEDURE — 36415 COLL VENOUS BLD VENIPUNCTURE: CPT

## 2017-04-10 PROCEDURE — 85652 RBC SED RATE AUTOMATED: CPT

## 2017-04-10 PROCEDURE — 85610 PROTHROMBIN TIME: CPT

## 2017-04-10 PROCEDURE — 86140 C-REACTIVE PROTEIN: CPT

## 2017-04-10 PROCEDURE — 97605 NEG PRS WND THER DME<=50SQCM: CPT

## 2017-04-10 PROCEDURE — 85025 COMPLETE CBC W/AUTO DIFF WBC: CPT

## 2017-04-10 PROCEDURE — 11042 DBRDMT SUBQ TIS 1ST 20SQCM/<: CPT

## 2017-04-10 PROCEDURE — 80053 COMPREHEN METABOLIC PANEL: CPT

## 2017-04-10 PROCEDURE — 97597 DBRDMT OPN WND 1ST 20 CM/<: CPT

## 2017-04-11 ENCOUNTER — SNF VISIT (OUTPATIENT)
Dept: INTERNAL MEDICINE CLINIC | Age: 72
End: 2017-04-11

## 2017-04-11 VITALS
TEMPERATURE: 97 F | SYSTOLIC BLOOD PRESSURE: 128 MMHG | OXYGEN SATURATION: 93 % | RESPIRATION RATE: 18 BRPM | HEART RATE: 68 BPM | DIASTOLIC BLOOD PRESSURE: 74 MMHG

## 2017-04-11 DIAGNOSIS — E11.42 TYPE 2 DIABETES MELLITUS WITH DIABETIC POLYNEUROPATHY, WITHOUT LONG-TERM CURRENT USE OF INSULIN (HCC): ICD-10-CM

## 2017-04-11 DIAGNOSIS — I73.9 PAD (PERIPHERAL ARTERY DISEASE) (HCC): ICD-10-CM

## 2017-04-11 DIAGNOSIS — M86.9 OSTEOMYELITIS OF RIGHT FOOT, UNSPECIFIED TYPE (HCC): ICD-10-CM

## 2017-04-11 DIAGNOSIS — E11.00 TYPE 2 DIABETES MELLITUS WITH HYPEROSMOLARITY WITHOUT COMA, WITHOUT LONG-TERM CURRENT USE OF INSULIN (HCC): ICD-10-CM

## 2017-04-11 DIAGNOSIS — E11.42 TYPE 2 DIABETES MELLITUS WITH DIABETIC POLYNEUROPATHY, WITH LONG-TERM CURRENT USE OF INSULIN (HCC): ICD-10-CM

## 2017-04-11 DIAGNOSIS — Z89.421 S/P AMPUTATION OF LESSER TOE, RIGHT (HCC): Primary | ICD-10-CM

## 2017-04-11 DIAGNOSIS — Z79.4 TYPE 2 DIABETES MELLITUS WITH DIABETIC POLYNEUROPATHY, WITH LONG-TERM CURRENT USE OF INSULIN (HCC): ICD-10-CM

## 2017-04-11 DIAGNOSIS — I48.91 ATRIAL FIBRILLATION, UNSPECIFIED TYPE (HCC): ICD-10-CM

## 2017-04-11 DIAGNOSIS — R53.1 WEAKNESS: ICD-10-CM

## 2017-04-11 DIAGNOSIS — I48.20 CHRONIC ATRIAL FIBRILLATION (HCC): ICD-10-CM

## 2017-04-11 PROCEDURE — 99308 SBSQ NF CARE LOW MDM 20: CPT | Performed by: NURSE PRACTITIONER

## 2017-04-11 NOTE — PROGRESS NOTES
Loren Sutton, 12/12/1945, 66year old, male at [facility]     Chief Complaint:  Patient presents with:   Follow - Up: s/p gangrene amp 3rd right toe on IV Ancef until 4.17        Subjective:    66year old male patient s/p right foot cellul cyanosis, or clubbing, or edema to distal RLE, radial pulses 2+ and dorsalis pedal pulses 2+, RLE w/ skin changes c/w PVD.  C and M intact b/l but d/t PN does not have any sensation to feet.   NEUROLOGIC: intact; no sensorimotor deficit, cranial nerves [place] Potassium 3.6-5.1 mmol/L 4.4     Chloride 101-111 mmol/L 104     CO2 22.0-32.0 mmol/L 33.0 (H)             Ref Range 4/10/17  5:05 AM       PT 12.0-14.3 seconds 29.6 (H)     Comments:    New lot of PT reagent started on February 28,2017.  The new PT refer controlled diet  2. Accu check BID; notify if <70 or >400- stable   3. Glimepiride 2 mg daily  4. Basaglar 11u q HS  5. Gabapentin 300 mg q AM and 600 mg q HS  6.  Last A1C 6.5% on 3.3.17          LAURA Riley  4/11/2017  11:35 AM

## 2017-04-13 ENCOUNTER — SNF VISIT (OUTPATIENT)
Dept: INTERNAL MEDICINE CLINIC | Age: 72
End: 2017-04-13

## 2017-04-13 ENCOUNTER — NURSE ONLY (OUTPATIENT)
Dept: LAB | Age: 72
End: 2017-04-13
Attending: FAMILY MEDICINE
Payer: MEDICARE

## 2017-04-13 VITALS
RESPIRATION RATE: 20 BRPM | OXYGEN SATURATION: 95 % | HEART RATE: 82 BPM | SYSTOLIC BLOOD PRESSURE: 120 MMHG | TEMPERATURE: 96 F | DIASTOLIC BLOOD PRESSURE: 68 MMHG

## 2017-04-13 DIAGNOSIS — E11.00 TYPE 2 DIABETES MELLITUS WITH HYPEROSMOLARITY WITHOUT COMA, WITHOUT LONG-TERM CURRENT USE OF INSULIN (HCC): ICD-10-CM

## 2017-04-13 DIAGNOSIS — M62.81 MUSCLE WEAKNESS: ICD-10-CM

## 2017-04-13 DIAGNOSIS — I48.91 ATRIAL FIBRILLATION, UNSPECIFIED TYPE (HCC): Primary | ICD-10-CM

## 2017-04-13 DIAGNOSIS — Z79.4 TYPE 2 DIABETES MELLITUS WITH DIABETIC POLYNEUROPATHY, WITH LONG-TERM CURRENT USE OF INSULIN (HCC): ICD-10-CM

## 2017-04-13 DIAGNOSIS — D68.32 WARFARIN-INDUCED COAGULOPATHY (HCC): ICD-10-CM

## 2017-04-13 DIAGNOSIS — T45.515A WARFARIN-INDUCED COAGULOPATHY (HCC): ICD-10-CM

## 2017-04-13 DIAGNOSIS — Z89.421 S/P AMPUTATION OF LESSER TOE, RIGHT (HCC): ICD-10-CM

## 2017-04-13 DIAGNOSIS — M86.9 OSTEOMYELITIS OF RIGHT FOOT, UNSPECIFIED TYPE (HCC): ICD-10-CM

## 2017-04-13 DIAGNOSIS — L03.90 CELLULITIS: Primary | ICD-10-CM

## 2017-04-13 DIAGNOSIS — E11.42 TYPE 2 DIABETES MELLITUS WITH DIABETIC POLYNEUROPATHY, WITH LONG-TERM CURRENT USE OF INSULIN (HCC): ICD-10-CM

## 2017-04-13 DIAGNOSIS — R53.1 WEAKNESS: ICD-10-CM

## 2017-04-13 PROCEDURE — 36415 COLL VENOUS BLD VENIPUNCTURE: CPT

## 2017-04-13 PROCEDURE — 85610 PROTHROMBIN TIME: CPT

## 2017-04-13 PROCEDURE — 99308 SBSQ NF CARE LOW MDM 20: CPT | Performed by: NURSE PRACTITIONER

## 2017-04-13 NOTE — PROGRESS NOTES
Akash Sutton, 12/12/1945, 66year old, male at [facility]     Chief Complaint:  Patient presents with:   Follow - Up       Subjective:    66year old male patient s/p right foot cellulitis mild OM s/p amputation of right 3rd toe and I & D o 2+ and dorsalis pedal pulses 2+, RLE w/ skin changes c/w PVD.  C and M intact b/l but d/t PN does not have any sensation to feet.   NEUROLOGIC: intact; no sensorimotor deficit, cranial nerves intact II-XII, follows commands  PSYCHIATRIC: alert and oriented CO2 22.0-32.0 mmol/L 33.0 (H)             Ref Range 4/10/17  5:05 AM       PT 12.0-14.3 seconds 29.6 (H)     Comments:    New lot of PT reagent started on February 28,2017.  The new PT reference range is 12.0-14.3 seconds.          INR 0.89-1.11  2.74 (H) notify if <70 or >400- stable   3. Glimepiride 2 mg daily  4. Basaglar 11u q HS  5. Gabapentin 300 mg q AM and 600 mg q HS  6.  Last A1C 6.5% on 3.3.17          LAURA Weiss  4/13/2017  11:35 AM

## 2017-04-17 ENCOUNTER — NURSE ONLY (OUTPATIENT)
Dept: LAB | Age: 72
End: 2017-04-17
Attending: FAMILY MEDICINE
Payer: MEDICARE

## 2017-04-17 ENCOUNTER — SNF VISIT (OUTPATIENT)
Dept: INTERNAL MEDICINE CLINIC | Age: 72
End: 2017-04-17

## 2017-04-17 ENCOUNTER — OFFICE VISIT (OUTPATIENT)
Dept: WOUND CARE | Facility: HOSPITAL | Age: 72
End: 2017-04-17
Attending: PODIATRIST
Payer: COMMERCIAL

## 2017-04-17 VITALS
OXYGEN SATURATION: 97 % | RESPIRATION RATE: 18 BRPM | DIASTOLIC BLOOD PRESSURE: 65 MMHG | SYSTOLIC BLOOD PRESSURE: 130 MMHG | HEART RATE: 72 BPM | TEMPERATURE: 98 F

## 2017-04-17 DIAGNOSIS — M62.81 MUSCLE WEAKNESS: ICD-10-CM

## 2017-04-17 DIAGNOSIS — E11.621 TYPE 2 DIABETES MELLITUS WITH FOOT ULCER (HCC): ICD-10-CM

## 2017-04-17 DIAGNOSIS — L97.522 NON-PRESSURE CHRONIC ULCER OF OTHER PART OF LEFT FOOT WITH FAT LAYER EXPOSED (HCC): ICD-10-CM

## 2017-04-17 DIAGNOSIS — R26.9 GAIT ABNORMALITY: ICD-10-CM

## 2017-04-17 DIAGNOSIS — Z79.4 TYPE 2 DIABETES MELLITUS WITH DIABETIC POLYNEUROPATHY, WITH LONG-TERM CURRENT USE OF INSULIN (HCC): ICD-10-CM

## 2017-04-17 DIAGNOSIS — L03.90 CELLULITIS: Primary | ICD-10-CM

## 2017-04-17 DIAGNOSIS — R53.1 WEAKNESS: ICD-10-CM

## 2017-04-17 DIAGNOSIS — Z89.421 S/P AMPUTATION OF LESSER TOE, RIGHT (HCC): Primary | ICD-10-CM

## 2017-04-17 DIAGNOSIS — L97.509 TYPE 2 DIABETES MELLITUS WITH FOOT ULCER (HCC): ICD-10-CM

## 2017-04-17 DIAGNOSIS — M86.9 OSTEOMYELITIS OF RIGHT FOOT, UNSPECIFIED TYPE (HCC): ICD-10-CM

## 2017-04-17 DIAGNOSIS — L97.919 NON-PRESSURE CHRONIC ULCER OF UNSPECIFIED PART OF RIGHT LOWER LEG WITH UNSPECIFIED SEVERITY (HCC): Primary | ICD-10-CM

## 2017-04-17 DIAGNOSIS — E11.42 TYPE 2 DIABETES MELLITUS WITH DIABETIC POLYNEUROPATHY, WITH LONG-TERM CURRENT USE OF INSULIN (HCC): ICD-10-CM

## 2017-04-17 PROCEDURE — 99309 SBSQ NF CARE MODERATE MDM 30: CPT | Performed by: NURSE PRACTITIONER

## 2017-04-17 PROCEDURE — 85025 COMPLETE CBC W/AUTO DIFF WBC: CPT

## 2017-04-17 PROCEDURE — 85652 RBC SED RATE AUTOMATED: CPT

## 2017-04-17 PROCEDURE — 86140 C-REACTIVE PROTEIN: CPT

## 2017-04-17 PROCEDURE — 80053 COMPREHEN METABOLIC PANEL: CPT

## 2017-04-17 PROCEDURE — 97605 NEG PRS WND THER DME<=50SQCM: CPT

## 2017-04-17 PROCEDURE — 36415 COLL VENOUS BLD VENIPUNCTURE: CPT

## 2017-04-18 NOTE — PROGRESS NOTES
Clovis Sutton, 12/12/1945, 66year old, male at [facility]     Chief Complaint:  Patient presents with:  Wound Recheck       Subjective:   PMH significant for CVA, A Fib, HTN, HL, PAD, T2 DM w/ PN, BPH, and LBP.  In HAILEY s/p right foot cellu PVD.  C and M intact b/l but d/t PN does not have any sensation to feet.   NEUROLOGIC: intact; no sensorimotor deficit, cranial nerves intact II-XII, follows commands  PSYCHIATRIC: alert and oriented x 3; affect appropriate    Medications reviewed: Yes    D CBC, ESR, CRP weekly    Gait abnormality/weakness  1. Fall precautions  2. PT/OT    3. ELOS 12-20 days  4. Plan DC on or before 4.17.17 (day after IV abx complete).   May go earlier if IV abx are able to be done at home    A Fib/HTN/PAD  1. VS q shift  2.  D

## 2017-04-20 ENCOUNTER — NURSE ONLY (OUTPATIENT)
Dept: LAB | Age: 72
End: 2017-04-20
Attending: FAMILY MEDICINE
Payer: MEDICARE

## 2017-04-20 DIAGNOSIS — Z79.01 LONG TERM (CURRENT) USE OF ANTICOAGULANTS: Primary | ICD-10-CM

## 2017-04-20 PROCEDURE — 85610 PROTHROMBIN TIME: CPT

## 2017-04-20 PROCEDURE — 36415 COLL VENOUS BLD VENIPUNCTURE: CPT

## 2017-04-21 ENCOUNTER — SNF VISIT (OUTPATIENT)
Dept: INTERNAL MEDICINE CLINIC | Age: 72
End: 2017-04-21

## 2017-04-21 VITALS
DIASTOLIC BLOOD PRESSURE: 57 MMHG | RESPIRATION RATE: 20 BRPM | OXYGEN SATURATION: 97 % | TEMPERATURE: 97 F | HEART RATE: 77 BPM | SYSTOLIC BLOOD PRESSURE: 119 MMHG

## 2017-04-21 DIAGNOSIS — I48.91 ATRIAL FIBRILLATION, UNSPECIFIED TYPE (HCC): ICD-10-CM

## 2017-04-21 DIAGNOSIS — Z89.421 S/P AMPUTATION OF LESSER TOE, RIGHT (HCC): Primary | ICD-10-CM

## 2017-04-21 DIAGNOSIS — M86.9 OSTEOMYELITIS OF RIGHT FOOT, UNSPECIFIED TYPE (HCC): ICD-10-CM

## 2017-04-21 PROCEDURE — 99309 SBSQ NF CARE MODERATE MDM 30: CPT | Performed by: NURSE PRACTITIONER

## 2017-04-22 NOTE — PROGRESS NOTES
Radha Sutton, 12/12/1945, 66year old, male at [facility]     Chief Complaint:  Patient presents with:   Follow - Up: s/p right 3rd toe amputation/OM       Subjective:   PMH significant for CVA, A Fib, HTN, HL, PAD, T2 DM w/ PN, BPH, and LB does not have any sensation to feet. NEUROLOGIC: intact; no sensorimotor deficit, cranial nerves intact II-XII, follows commands  PSYCHIATRIC: alert and oriented x 3; affect appropriate    Medications reviewed: Yes    Diagnostics reviewed:     Accu check r

## 2017-04-24 ENCOUNTER — OFFICE VISIT (OUTPATIENT)
Dept: WOUND CARE | Facility: HOSPITAL | Age: 72
End: 2017-04-24
Attending: PODIATRIST
Payer: COMMERCIAL

## 2017-04-24 ENCOUNTER — NURSE ONLY (OUTPATIENT)
Dept: LAB | Age: 72
End: 2017-04-24
Attending: FAMILY MEDICINE
Payer: MEDICARE

## 2017-04-24 DIAGNOSIS — L97.512 RIGHT FOOT ULCER, WITH FAT LAYER EXPOSED (HCC): ICD-10-CM

## 2017-04-24 DIAGNOSIS — L97.919 NON-PRESSURE CHRONIC ULCER OF UNSPECIFIED PART OF RIGHT LOWER LEG WITH UNSPECIFIED SEVERITY (HCC): Primary | ICD-10-CM

## 2017-04-24 DIAGNOSIS — E11.9 DM (DIABETES MELLITUS) (HCC): Primary | ICD-10-CM

## 2017-04-24 DIAGNOSIS — L97.509 TYPE 2 DIABETES MELLITUS WITH FOOT ULCER (HCC): ICD-10-CM

## 2017-04-24 DIAGNOSIS — E11.621 TYPE 2 DIABETES MELLITUS WITH FOOT ULCER (HCC): ICD-10-CM

## 2017-04-24 DIAGNOSIS — L03.90 CELLULITIS: ICD-10-CM

## 2017-04-24 DIAGNOSIS — L97.522 NON-PRESSURE CHRONIC ULCER OF OTHER PART OF LEFT FOOT WITH FAT LAYER EXPOSED (HCC): ICD-10-CM

## 2017-04-24 PROCEDURE — 86140 C-REACTIVE PROTEIN: CPT

## 2017-04-24 PROCEDURE — 97605 NEG PRS WND THER DME<=50SQCM: CPT

## 2017-04-24 PROCEDURE — 85025 COMPLETE CBC W/AUTO DIFF WBC: CPT

## 2017-04-24 PROCEDURE — 85652 RBC SED RATE AUTOMATED: CPT

## 2017-04-24 PROCEDURE — 80053 COMPREHEN METABOLIC PANEL: CPT

## 2017-04-24 PROCEDURE — 36415 COLL VENOUS BLD VENIPUNCTURE: CPT

## 2017-04-25 ENCOUNTER — SNF DISCHARGE (OUTPATIENT)
Dept: INTERNAL MEDICINE CLINIC | Age: 72
End: 2017-04-25

## 2017-04-25 VITALS
RESPIRATION RATE: 20 BRPM | TEMPERATURE: 97 F | HEART RATE: 69 BPM | OXYGEN SATURATION: 97 % | SYSTOLIC BLOOD PRESSURE: 125 MMHG | DIASTOLIC BLOOD PRESSURE: 67 MMHG

## 2017-04-25 DIAGNOSIS — I10 ESSENTIAL HYPERTENSION: ICD-10-CM

## 2017-04-25 DIAGNOSIS — E11.42 TYPE 2 DIABETES MELLITUS WITH DIABETIC POLYNEUROPATHY, WITH LONG-TERM CURRENT USE OF INSULIN (HCC): ICD-10-CM

## 2017-04-25 DIAGNOSIS — Z79.4 TYPE 2 DIABETES MELLITUS WITH DIABETIC POLYNEUROPATHY, WITH LONG-TERM CURRENT USE OF INSULIN (HCC): ICD-10-CM

## 2017-04-25 DIAGNOSIS — I73.9 PAD (PERIPHERAL ARTERY DISEASE) (HCC): ICD-10-CM

## 2017-04-25 DIAGNOSIS — R26.9 GAIT ABNORMALITY: ICD-10-CM

## 2017-04-25 DIAGNOSIS — M86.9 OSTEOMYELITIS OF RIGHT FOOT, UNSPECIFIED TYPE (HCC): ICD-10-CM

## 2017-04-25 DIAGNOSIS — I48.91 ATRIAL FIBRILLATION, UNSPECIFIED TYPE (HCC): ICD-10-CM

## 2017-04-25 DIAGNOSIS — Z89.421 S/P AMPUTATION OF LESSER TOE, RIGHT (HCC): Primary | ICD-10-CM

## 2017-04-25 DIAGNOSIS — E78.2 MIXED HYPERLIPIDEMIA: ICD-10-CM

## 2017-04-25 PROCEDURE — 99316 NF DSCHRG MGMT 30 MIN+: CPT | Performed by: NURSE PRACTITIONER

## 2017-04-25 NOTE — PROGRESS NOTES
April Sutton, 12/12/1945, 66year old, male is being discharged from Facility: [facility]    Date of Admission:  3.25.17    Date of Discharge:  Anticipated on 4.26.17                                 Admitting Diag place and functional, small amount sanguinous drainage noted in tubing, gauze drsg CDI  Right anterior LE:  CDI drsg  Lt lateral foot--CDI drsg intact  EYES: PERRLA, EOMI, sclera anicteric, conjunctiva normal; there is no nystagmus, no drainage from eyes, 04/24/2017       Lab Results  Component Value Date   WBC 8.6 04/24/2017   RBC 5.05 04/24/2017   HGB 15.9 04/24/2017   HCT 47.3 04/24/2017   MCV 93.7 04/24/2017   MCH 31.5 04/24/2017   MCHC 33.6 04/24/2017   RDW 13.7 04/24/2017   .0 04/24/2017   MPV

## 2017-04-27 ENCOUNTER — TELEPHONE (OUTPATIENT)
Dept: INTERNAL MEDICINE CLINIC | Facility: CLINIC | Age: 72
End: 2017-04-27

## 2017-04-27 ENCOUNTER — NURSE ONLY (OUTPATIENT)
Dept: LAB | Age: 72
End: 2017-04-27
Attending: FAMILY MEDICINE
Payer: MEDICARE

## 2017-04-27 ENCOUNTER — PATIENT OUTREACH (OUTPATIENT)
Dept: CASE MANAGEMENT | Age: 72
End: 2017-04-27

## 2017-04-27 DIAGNOSIS — I48.91 ATRIAL FIBRILLATION (HCC): Primary | ICD-10-CM

## 2017-04-27 NOTE — PROGRESS NOTES
Initial Post Discharge Follow Up   Discharge Date: 4/26/2017 from [facility] Date: 4/27/2017    Consent Verification:  Assessment Completed With: Patient  HIPAA Verified?   Yes    General:   • How have you been since your discharge from the hosp 1 tablet (2.5 mg total) by mouth once daily.  Disp: 90 tablet Rfl: 3   DIGOX 250 MCG Oral Tab TAKE 1 TABLET BY MOUTH EVERY MORNING Disp: 90 tablet Rfl: 3   METOPROLOL SUCCINATE  MG Oral Tablet 24 Hr TAKE 1 TABLET BY MOUTH EVERY DAY Disp: 90 tablet Rfl make your appointments? No     NCM Reviewed upcoming Specialist Appt with patient     Not Applicable  Overall Rating:    How would you rate the care you received while in the hospital?  good)           Interventions by NCM: spoke with pt about d/c and imp

## 2017-05-01 ENCOUNTER — OFFICE VISIT (OUTPATIENT)
Dept: WOUND CARE | Facility: HOSPITAL | Age: 72
End: 2017-05-01
Attending: PODIATRIST
Payer: MEDICARE

## 2017-05-01 ENCOUNTER — OFFICE VISIT (OUTPATIENT)
Dept: INTERNAL MEDICINE CLINIC | Facility: CLINIC | Age: 72
End: 2017-05-01

## 2017-05-01 ENCOUNTER — TELEPHONE (OUTPATIENT)
Dept: INTERNAL MEDICINE CLINIC | Facility: CLINIC | Age: 72
End: 2017-05-01

## 2017-05-01 VITALS
BODY MASS INDEX: 36 KG/M2 | WEIGHT: 265 LBS | DIASTOLIC BLOOD PRESSURE: 64 MMHG | SYSTOLIC BLOOD PRESSURE: 126 MMHG | RESPIRATION RATE: 12 BRPM | HEART RATE: 60 BPM

## 2017-05-01 DIAGNOSIS — M86.9 OSTEOMYELITIS OF RIGHT FOOT, UNSPECIFIED TYPE (HCC): ICD-10-CM

## 2017-05-01 DIAGNOSIS — T45.515A WARFARIN-INDUCED COAGULOPATHY (HCC): ICD-10-CM

## 2017-05-01 DIAGNOSIS — L03.119 CELLULITIS OF FOOT: ICD-10-CM

## 2017-05-01 DIAGNOSIS — Z09 HOSPITAL DISCHARGE FOLLOW-UP: Primary | ICD-10-CM

## 2017-05-01 DIAGNOSIS — L97.919 NON-PRESSURE CHRONIC ULCER OF UNSPECIFIED PART OF RIGHT LOWER LEG WITH UNSPECIFIED SEVERITY (HCC): Primary | ICD-10-CM

## 2017-05-01 DIAGNOSIS — E11.42 TYPE 2 DIABETES MELLITUS WITH DIABETIC POLYNEUROPATHY, WITH LONG-TERM CURRENT USE OF INSULIN (HCC): ICD-10-CM

## 2017-05-01 DIAGNOSIS — D68.32 WARFARIN-INDUCED COAGULOPATHY (HCC): ICD-10-CM

## 2017-05-01 DIAGNOSIS — G89.29 CHRONIC BILATERAL LOW BACK PAIN WITHOUT SCIATICA: ICD-10-CM

## 2017-05-01 DIAGNOSIS — L97.509 TYPE 2 DIABETES MELLITUS WITH FOOT ULCER (HCC): ICD-10-CM

## 2017-05-01 DIAGNOSIS — Z79.4 TYPE 2 DIABETES MELLITUS WITH DIABETIC POLYNEUROPATHY, WITH LONG-TERM CURRENT USE OF INSULIN (HCC): ICD-10-CM

## 2017-05-01 DIAGNOSIS — Z79.01 LONG TERM (CURRENT) USE OF ANTICOAGULANTS: ICD-10-CM

## 2017-05-01 DIAGNOSIS — E11.621 TYPE 2 DIABETES MELLITUS WITH FOOT ULCER (HCC): ICD-10-CM

## 2017-05-01 DIAGNOSIS — I10 ESSENTIAL HYPERTENSION: ICD-10-CM

## 2017-05-01 DIAGNOSIS — L97.522 NON-PRESSURE CHRONIC ULCER OF OTHER PART OF LEFT FOOT WITH FAT LAYER EXPOSED (HCC): ICD-10-CM

## 2017-05-01 DIAGNOSIS — L97.512 RIGHT FOOT ULCER, WITH FAT LAYER EXPOSED (HCC): ICD-10-CM

## 2017-05-01 DIAGNOSIS — I73.9 PAD (PERIPHERAL ARTERY DISEASE) (HCC): ICD-10-CM

## 2017-05-01 DIAGNOSIS — M54.50 CHRONIC BILATERAL LOW BACK PAIN WITHOUT SCIATICA: ICD-10-CM

## 2017-05-01 PROBLEM — E11.65 UNCONTROLLED TYPE 2 DIABETES MELLITUS WITH DIABETIC POLYNEUROPATHY, WITH LONG-TERM CURRENT USE OF INSULIN (HCC): Status: RESOLVED | Noted: 2017-05-01 | Resolved: 2017-05-01

## 2017-05-01 PROBLEM — E11.65 UNCONTROLLED TYPE 2 DIABETES MELLITUS WITH DIABETIC POLYNEUROPATHY, WITH LONG-TERM CURRENT USE OF INSULIN (HCC): Status: ACTIVE | Noted: 2017-05-01

## 2017-05-01 PROBLEM — IMO0002 UNCONTROLLED TYPE 2 DIABETES MELLITUS WITH DIABETIC POLYNEUROPATHY, WITH LONG-TERM CURRENT USE OF INSULIN: Status: RESOLVED | Noted: 2017-05-01 | Resolved: 2017-05-01

## 2017-05-01 PROBLEM — IMO0002 UNCONTROLLED TYPE 2 DIABETES MELLITUS WITH DIABETIC POLYNEUROPATHY, WITH LONG-TERM CURRENT USE OF INSULIN: Status: ACTIVE | Noted: 2017-05-01

## 2017-05-01 PROCEDURE — 97605 NEG PRS WND THER DME<=50SQCM: CPT

## 2017-05-01 PROCEDURE — 99495 TRANSJ CARE MGMT MOD F2F 14D: CPT | Performed by: INTERNAL MEDICINE

## 2017-05-01 PROCEDURE — 11042 DBRDMT SUBQ TIS 1ST 20SQCM/<: CPT

## 2017-05-01 NOTE — PROGRESS NOTES
HPI:    Juanis Bowie is a 66year old male here today for hospital follow up. DISCHARGE SUMMARY    Date of Admission:  3.25.17    Date of Discharge:  Anticipated on 4.26.17                                 Admitting Diagnoses:  1.  S/P amputation 3 3/20/2017  Date of Discharge: 3/25/2017  Discharge Disposition: SNF    Admitting Diagnosis:    Cellulitis of foot [L03.119]    Hospital Discharge Diagnoses: see below    TCM Diagnosis at discharge from Hospital: Other: see below; no TCM follow-up needed complaints worsening of his diabetic ulcer and cellulitis.  He was seen by ID and podiatry in consultation.  MRI revealed soft tissue ulceration along the second MTP joint as well as inflammatory phlegmon of the second MTP joint and flexor tendon sheath co hypertrophy); Polyneuropathy in diabetes ([facility] Chest pain; Generalized hyperhidrosis; Benign neoplasm of colon; S/P amputation of lesser toe ([facility] Tonsillitis; Appendicitis; Internal hemorrhoids; Arm pain; Neuropathy ([facility]  Hammertoe; Bunion; Onychomycos helped.  He does not have f/u w/spine  His right shoulder is hurting for months, w/abduction, into upper arm  NEURO: denies headaches, denies dizziness, denies weakness  PSYCHE: denies depression or anxiety  HEMATOLOGIC: denies hx of anemia, or bruising, la 04/24/2017    HCT  47.3  04/24/2017    MCV  93.7  04/24/2017    MCH  31.5  04/24/2017    MCHC  33.6  04/24/2017    RDW  13.7  04/24/2017    PLT  306.0  04/24/2017                ASSESSMENT/ PLAN:   Diagnoses and all orders for this visit:    Hospital disch

## 2017-05-01 NOTE — TELEPHONE ENCOUNTER
Please call residential and ask PT to add right shoulder exercises  Diagnosis: rotator cuff tendonitis

## 2017-05-01 NOTE — TELEPHONE ENCOUNTER
Unable to leave voicemail on María's direct line, voicemail box full. Spoke to reception at [place] [id number] ([phone]) who transferred call to a different line to leave a message. General msg left for Jing Delcid to call back.  Will try her line aga

## 2017-05-01 NOTE — TELEPHONE ENCOUNTER
Spoke with Avery Vera at [facility]. Physical Therapist, Kateryna Singh, was there this weekend to evaluate, evaluation form not yet completed.  Avery Vera is not sure if OT was approved yet so requesting to have PT handle lower extremities and OT do upper ex

## 2017-05-08 ENCOUNTER — OFFICE VISIT (OUTPATIENT)
Dept: WOUND CARE | Facility: HOSPITAL | Age: 72
End: 2017-05-08
Attending: PODIATRIST
Payer: MEDICARE

## 2017-05-08 DIAGNOSIS — L97.919 NON-PRESSURE CHRONIC ULCER OF UNSPECIFIED PART OF RIGHT LOWER LEG WITH UNSPECIFIED SEVERITY (HCC): Primary | ICD-10-CM

## 2017-05-08 DIAGNOSIS — L97.522 NON-PRESSURE CHRONIC ULCER OF OTHER PART OF LEFT FOOT WITH FAT LAYER EXPOSED (HCC): ICD-10-CM

## 2017-05-08 PROCEDURE — 11042 DBRDMT SUBQ TIS 1ST 20SQCM/<: CPT

## 2017-05-09 ENCOUNTER — MED REC SCAN ONLY (OUTPATIENT)
Dept: INTERNAL MEDICINE CLINIC | Facility: CLINIC | Age: 72
End: 2017-05-09

## 2017-05-11 ENCOUNTER — TELEPHONE (OUTPATIENT)
Dept: INTERNAL MEDICINE CLINIC | Facility: CLINIC | Age: 72
End: 2017-05-11

## 2017-05-12 ENCOUNTER — TELEPHONE (OUTPATIENT)
Dept: INTERNAL MEDICINE CLINIC | Facility: CLINIC | Age: 72
End: 2017-05-12

## 2017-05-12 NOTE — TELEPHONE ENCOUNTER
Incoming (mail or fax): Fax  Received from:  [facility] Results  Documentation given to:  Rao Azevedo test results folder.

## 2017-05-12 NOTE — TELEPHONE ENCOUNTER
Received copy of PT/INR results done 4/27/17. This is a courtesy copy. Pt is managed at [facility].

## 2017-05-18 ENCOUNTER — MED REC SCAN ONLY (OUTPATIENT)
Dept: INTERNAL MEDICINE CLINIC | Facility: CLINIC | Age: 72
End: 2017-05-18

## 2017-05-22 ENCOUNTER — OFFICE VISIT (OUTPATIENT)
Dept: WOUND CARE | Facility: HOSPITAL | Age: 72
End: 2017-05-22
Attending: PODIATRIST
Payer: MEDICARE

## 2017-05-22 DIAGNOSIS — L97.919 NON-PRESSURE CHRONIC ULCER OF UNSPECIFIED PART OF RIGHT LOWER LEG WITH UNSPECIFIED SEVERITY (HCC): Primary | ICD-10-CM

## 2017-05-22 DIAGNOSIS — L97.522 NON-PRESSURE CHRONIC ULCER OF OTHER PART OF LEFT FOOT WITH FAT LAYER EXPOSED (HCC): ICD-10-CM

## 2017-05-22 DIAGNOSIS — L97.509 TYPE 2 DIABETES MELLITUS WITH FOOT ULCER (HCC): ICD-10-CM

## 2017-05-22 DIAGNOSIS — L97.512 RIGHT FOOT ULCER, WITH FAT LAYER EXPOSED (HCC): ICD-10-CM

## 2017-05-22 DIAGNOSIS — E11.621 TYPE 2 DIABETES MELLITUS WITH FOOT ULCER (HCC): ICD-10-CM

## 2017-05-22 PROCEDURE — 97605 NEG PRS WND THER DME<=50SQCM: CPT

## 2017-05-22 PROCEDURE — 11042 DBRDMT SUBQ TIS 1ST 20SQCM/<: CPT

## 2017-05-22 RX ORDER — GABAPENTIN 300 MG/1
CAPSULE ORAL
Qty: 270 CAPSULE | Refills: 3 | Status: SHIPPED | OUTPATIENT
Start: 2017-05-22

## 2017-05-22 NOTE — TELEPHONE ENCOUNTER
Last OV pertinent to medication: 5/1/17 for HFU  Last refill date: 2/14/17     #/refills: #270 + 0  When pt was asked to return for OV: 3 months   Upcoming appt/reason: Next OV 8/3/17 for DM check      Lab Results  Component Value Date   * 04/24/201

## 2017-06-05 ENCOUNTER — OFFICE VISIT (OUTPATIENT)
Dept: WOUND CARE | Facility: HOSPITAL | Age: 72
End: 2017-06-05
Attending: PODIATRIST
Payer: MEDICARE

## 2017-06-05 ENCOUNTER — TELEPHONE (OUTPATIENT)
Dept: INTERNAL MEDICINE CLINIC | Facility: CLINIC | Age: 72
End: 2017-06-05

## 2017-06-05 DIAGNOSIS — L97.509 TYPE 2 DIABETES MELLITUS WITH FOOT ULCER (HCC): ICD-10-CM

## 2017-06-05 DIAGNOSIS — L97.919 NON-PRESSURE CHRONIC ULCER OF UNSPECIFIED PART OF RIGHT LOWER LEG WITH UNSPECIFIED SEVERITY (HCC): Primary | ICD-10-CM

## 2017-06-05 DIAGNOSIS — L97.522 NON-PRESSURE CHRONIC ULCER OF OTHER PART OF LEFT FOOT WITH FAT LAYER EXPOSED (HCC): ICD-10-CM

## 2017-06-05 DIAGNOSIS — E11.621 TYPE 2 DIABETES MELLITUS WITH FOOT ULCER (HCC): ICD-10-CM

## 2017-06-05 PROCEDURE — 11042 DBRDMT SUBQ TIS 1ST 20SQCM/<: CPT

## 2017-06-05 NOTE — TELEPHONE ENCOUNTER
Per [address], patient is now going to the [address] [place] [address] and Dr. Jarod Aviles is writing the orders for Wound Care at [facility].   [facility] indicated that she needs Dr. Yahir Lawler approval to add this provider to patient's record so they can accept

## 2017-06-05 NOTE — TELEPHONE ENCOUNTER
Spoke with Filemon Florian at [facility] to advise okay to add Dr Veronika Breaux to pt's record so orders can be accepted directly, per Dr Rain Chicas.

## 2017-06-05 NOTE — TELEPHONE ENCOUNTER
Noted below. Please advise. OK to add Dr. Lizet Walden name to pt's record so Dr. Lizet Walden wound care orders can be accepted directly?

## 2017-06-08 ENCOUNTER — MED REC SCAN ONLY (OUTPATIENT)
Dept: INTERNAL MEDICINE CLINIC | Facility: CLINIC | Age: 72
End: 2017-06-08

## 2017-06-12 ENCOUNTER — HOSPITAL ENCOUNTER (OUTPATIENT)
Dept: GENERAL RADIOLOGY | Facility: HOSPITAL | Age: 72
Discharge: HOME | End: 2017-06-12
Attending: PODIATRIST
Payer: MEDICARE

## 2017-06-12 ENCOUNTER — OFFICE VISIT (OUTPATIENT)
Dept: WOUND CARE | Facility: HOSPITAL | Age: 72
End: 2017-06-12
Attending: PODIATRIST
Payer: MEDICARE

## 2017-06-12 DIAGNOSIS — L97.522 NON-PRESSURE CHRONIC ULCER OF OTHER PART OF LEFT FOOT WITH FAT LAYER EXPOSED (HCC): ICD-10-CM

## 2017-06-12 DIAGNOSIS — L97.919 NON-PRESSURE CHRONIC ULCER OF UNSPECIFIED PART OF RIGHT LOWER LEG WITH UNSPECIFIED SEVERITY (HCC): Primary | ICD-10-CM

## 2017-06-12 DIAGNOSIS — M86.9 OSTEOMYELITIS (HCC): ICD-10-CM

## 2017-06-12 DIAGNOSIS — L97.509 TYPE 2 DIABETES MELLITUS WITH FOOT ULCER (HCC): ICD-10-CM

## 2017-06-12 DIAGNOSIS — E11.621 TYPE 2 DIABETES MELLITUS WITH FOOT ULCER (HCC): ICD-10-CM

## 2017-06-12 PROCEDURE — 11042 DBRDMT SUBQ TIS 1ST 20SQCM/<: CPT

## 2017-06-12 PROCEDURE — 73630 X-RAY EXAM OF FOOT: CPT | Performed by: PODIATRIST

## 2017-06-26 ENCOUNTER — OFFICE VISIT (OUTPATIENT)
Dept: WOUND CARE | Facility: HOSPITAL | Age: 72
End: 2017-06-26
Attending: PODIATRIST
Payer: MEDICARE

## 2017-06-26 DIAGNOSIS — L97.919 NON-PRESSURE CHRONIC ULCER OF UNSPECIFIED PART OF RIGHT LOWER LEG WITH UNSPECIFIED SEVERITY (HCC): Primary | ICD-10-CM

## 2017-06-26 DIAGNOSIS — L97.522 NON-PRESSURE CHRONIC ULCER OF OTHER PART OF LEFT FOOT WITH FAT LAYER EXPOSED (HCC): ICD-10-CM

## 2017-06-26 DIAGNOSIS — L97.512 RIGHT FOOT ULCER, WITH FAT LAYER EXPOSED (HCC): ICD-10-CM

## 2017-06-26 DIAGNOSIS — L97.509 TYPE 2 DIABETES MELLITUS WITH FOOT ULCER (HCC): ICD-10-CM

## 2017-06-26 DIAGNOSIS — E11.621 TYPE 2 DIABETES MELLITUS WITH FOOT ULCER (HCC): ICD-10-CM

## 2017-06-26 PROCEDURE — 11042 DBRDMT SUBQ TIS 1ST 20SQCM/<: CPT

## 2017-07-10 ENCOUNTER — OFFICE VISIT (OUTPATIENT)
Dept: WOUND CARE | Facility: HOSPITAL | Age: 72
End: 2017-07-10
Attending: PODIATRIST
Payer: MEDICARE

## 2017-07-10 ENCOUNTER — MED REC SCAN ONLY (OUTPATIENT)
Dept: INTERNAL MEDICINE CLINIC | Facility: CLINIC | Age: 72
End: 2017-07-10

## 2017-07-10 DIAGNOSIS — L97.509 TYPE II DIABETES MELLITUS WITH FOOT ULCER (HCC): Primary | ICD-10-CM

## 2017-07-10 DIAGNOSIS — L97.522 NON-PRESSURE CHRONIC ULCER OF OTHER PART OF LEFT FOOT WITH FAT LAYER EXPOSED (HCC): ICD-10-CM

## 2017-07-10 DIAGNOSIS — L97.512 RIGHT FOOT ULCER, WITH FAT LAYER EXPOSED (HCC): ICD-10-CM

## 2017-07-10 DIAGNOSIS — L97.919 NON-PRESSURE CHRONIC ULCER OF UNSPECIFIED PART OF RIGHT LOWER LEG WITH UNSPECIFIED SEVERITY (HCC): ICD-10-CM

## 2017-07-10 DIAGNOSIS — E11.621 TYPE II DIABETES MELLITUS WITH FOOT ULCER (HCC): Primary | ICD-10-CM

## 2017-07-10 PROCEDURE — 87070 CULTURE OTHR SPECIMN AEROBIC: CPT

## 2017-07-10 PROCEDURE — 87147 CULTURE TYPE IMMUNOLOGIC: CPT

## 2017-07-10 PROCEDURE — 87205 SMEAR GRAM STAIN: CPT

## 2017-07-10 PROCEDURE — 87077 CULTURE AEROBIC IDENTIFY: CPT

## 2017-07-10 PROCEDURE — 87186 SC STD MICRODIL/AGAR DIL: CPT

## 2017-07-10 PROCEDURE — 11042 DBRDMT SUBQ TIS 1ST 20SQCM/<: CPT

## 2017-07-19 ENCOUNTER — TELEPHONE (OUTPATIENT)
Dept: INTERNAL MEDICINE CLINIC | Facility: CLINIC | Age: 72
End: 2017-07-19

## 2017-07-19 NOTE — TELEPHONE ENCOUNTER
Received 2017 DM Eye Exam from Dr. Josr Moulton at [facility]. Updated in Epic and Sent to Scan.

## 2017-07-19 NOTE — TELEPHONE ENCOUNTER
Incoming (mail or fax): Fax  Received from:  [facility] w/ Dr. Darin Paris   Documentation given to:  Dr. Edilberto Walters nurse, placed on [facility] [address] desk.

## 2017-07-20 ENCOUNTER — MED REC SCAN ONLY (OUTPATIENT)
Dept: INTERNAL MEDICINE CLINIC | Facility: CLINIC | Age: 72
End: 2017-07-20

## 2017-07-31 ENCOUNTER — APPOINTMENT (OUTPATIENT)
Dept: LAB | Facility: HOSPITAL | Age: 72
End: 2017-07-31
Attending: INTERNAL MEDICINE
Payer: MEDICARE

## 2017-07-31 ENCOUNTER — OFFICE VISIT (OUTPATIENT)
Dept: WOUND CARE | Facility: HOSPITAL | Age: 72
End: 2017-07-31
Attending: INTERNAL MEDICINE
Payer: MEDICARE

## 2017-07-31 ENCOUNTER — HOSPITAL ENCOUNTER (OUTPATIENT)
Dept: CV DIAGNOSTICS | Facility: HOSPITAL | Age: 72
Discharge: HOME | End: 2017-07-31
Attending: INTERNAL MEDICINE
Payer: MEDICARE

## 2017-07-31 DIAGNOSIS — R60.0 EXTREMITY EDEMA: ICD-10-CM

## 2017-07-31 DIAGNOSIS — I10 ESSENTIAL HYPERTENSION: ICD-10-CM

## 2017-07-31 DIAGNOSIS — Z79.01 LONG TERM (CURRENT) USE OF ANTICOAGULANTS: ICD-10-CM

## 2017-07-31 DIAGNOSIS — L97.919 NON-PRESSURE CHRONIC ULCER OF UNSPECIFIED PART OF RIGHT LOWER LEG WITH UNSPECIFIED SEVERITY (HCC): ICD-10-CM

## 2017-07-31 DIAGNOSIS — L97.509 TYPE II DIABETES MELLITUS WITH FOOT ULCER (HCC): Primary | ICD-10-CM

## 2017-07-31 DIAGNOSIS — I48.20 CHRONIC ATRIAL FIBRILLATION (HCC): ICD-10-CM

## 2017-07-31 DIAGNOSIS — Z79.4 TYPE 2 DIABETES MELLITUS WITH DIABETIC POLYNEUROPATHY, WITH LONG-TERM CURRENT USE OF INSULIN (HCC): ICD-10-CM

## 2017-07-31 DIAGNOSIS — E11.621 TYPE II DIABETES MELLITUS WITH FOOT ULCER (HCC): Primary | ICD-10-CM

## 2017-07-31 DIAGNOSIS — E11.42 TYPE 2 DIABETES MELLITUS WITH DIABETIC POLYNEUROPATHY, WITH LONG-TERM CURRENT USE OF INSULIN (HCC): ICD-10-CM

## 2017-07-31 DIAGNOSIS — L97.522 NON-PRESSURE CHRONIC ULCER OF OTHER PART OF LEFT FOOT WITH FAT LAYER EXPOSED (HCC): ICD-10-CM

## 2017-07-31 LAB
ALBUMIN SERPL-MCNC: 3.7 G/DL (ref 3.5–4.8)
ALP LIVER SERPL-CCNC: 43 U/L (ref 45–117)
ALT SERPL-CCNC: 20 U/L (ref 17–63)
AST SERPL-CCNC: 17 U/L (ref 15–41)
BILIRUB SERPL-MCNC: 0.6 MG/DL (ref 0.1–2)
BUN BLD-MCNC: 20 MG/DL (ref 8–20)
CALCIUM BLD-MCNC: 9.6 MG/DL (ref 8.3–10.3)
CHLORIDE: 103 MMOL/L (ref 101–111)
CHOLEST SMN-MCNC: 114 MG/DL (ref ?–200)
CO2: 26 MMOL/L (ref 22–32)
CREAT BLD-MCNC: 1.27 MG/DL (ref 0.7–1.3)
EST. AVERAGE GLUCOSE BLD GHB EST-MCNC: 137 MG/DL (ref 68–126)
GLUCOSE BLD-MCNC: 98 MG/DL (ref 70–99)
HBA1C MFR BLD HPLC: 6.4 % (ref ?–5.7)
HDLC SERPL-MCNC: 36 MG/DL (ref 45–?)
HDLC SERPL: 3.17 {RATIO} (ref ?–4.97)
LDLC SERPL CALC-MCNC: 59 MG/DL (ref ?–130)
LDLC SERPL-MCNC: 19 MG/DL (ref 5–40)
M PROTEIN MFR SERPL ELPH: 8.5 G/DL (ref 6.1–8.3)
NONHDLC SERPL-MCNC: 78 MG/DL (ref ?–130)
POTASSIUM SERPL-SCNC: 4.8 MMOL/L (ref 3.6–5.1)
SODIUM SERPL-SCNC: 137 MMOL/L (ref 136–144)
TRIGLYCERIDES: 93 MG/DL (ref ?–150)
TSI SER-ACNC: 1.07 MIU/ML (ref 0.35–5.5)

## 2017-07-31 PROCEDURE — 93306 TTE W/DOPPLER COMPLETE: CPT | Performed by: INTERNAL MEDICINE

## 2017-07-31 PROCEDURE — 36415 COLL VENOUS BLD VENIPUNCTURE: CPT

## 2017-07-31 PROCEDURE — 97597 DBRDMT OPN WND 1ST 20 CM/<: CPT

## 2017-07-31 PROCEDURE — 84443 ASSAY THYROID STIM HORMONE: CPT

## 2017-07-31 PROCEDURE — 83036 HEMOGLOBIN GLYCOSYLATED A1C: CPT

## 2017-07-31 PROCEDURE — 82043 UR ALBUMIN QUANTITATIVE: CPT

## 2017-07-31 PROCEDURE — 80053 COMPREHEN METABOLIC PANEL: CPT

## 2017-07-31 PROCEDURE — 11042 DBRDMT SUBQ TIS 1ST 20SQCM/<: CPT

## 2017-07-31 PROCEDURE — 80061 LIPID PANEL: CPT

## 2017-07-31 PROCEDURE — 82570 ASSAY OF URINE CREATININE: CPT

## 2017-08-01 ENCOUNTER — APPOINTMENT (OUTPATIENT)
Dept: LAB | Facility: HOSPITAL | Age: 72
End: 2017-08-01
Attending: INTERNAL MEDICINE
Payer: MEDICARE

## 2017-08-01 DIAGNOSIS — Z79.4 TYPE 2 DIABETES MELLITUS WITH DIABETIC POLYNEUROPATHY, WITH LONG-TERM CURRENT USE OF INSULIN (HCC): ICD-10-CM

## 2017-08-01 DIAGNOSIS — E11.42 TYPE 2 DIABETES MELLITUS WITH DIABETIC POLYNEUROPATHY, WITH LONG-TERM CURRENT USE OF INSULIN (HCC): ICD-10-CM

## 2017-08-01 LAB
CREAT UR-SCNC: 136 MG/DL
MICROALBUMIN UR-MCNC: 1.33 MG/DL
MICROALBUMIN/CREAT 24H UR-RTO: 9.8 UG/MG (ref ?–30)

## 2017-08-03 ENCOUNTER — OFFICE VISIT (OUTPATIENT)
Dept: INTERNAL MEDICINE CLINIC | Facility: CLINIC | Age: 72
End: 2017-08-03

## 2017-08-03 VITALS
HEART RATE: 60 BPM | TEMPERATURE: 98 F | DIASTOLIC BLOOD PRESSURE: 84 MMHG | WEIGHT: 262 LBS | RESPIRATION RATE: 16 BRPM | HEIGHT: 72 IN | BODY MASS INDEX: 35.49 KG/M2 | SYSTOLIC BLOOD PRESSURE: 122 MMHG | OXYGEN SATURATION: 99 %

## 2017-08-03 DIAGNOSIS — E11.42 TYPE 2 DIABETES MELLITUS WITH DIABETIC POLYNEUROPATHY, WITH LONG-TERM CURRENT USE OF INSULIN (HCC): Primary | ICD-10-CM

## 2017-08-03 DIAGNOSIS — Z79.4 TYPE 2 DIABETES MELLITUS WITH DIABETIC POLYNEUROPATHY, WITH LONG-TERM CURRENT USE OF INSULIN (HCC): Primary | ICD-10-CM

## 2017-08-03 DIAGNOSIS — I10 ESSENTIAL HYPERTENSION: ICD-10-CM

## 2017-08-03 DIAGNOSIS — E78.00 PURE HYPERCHOLESTEROLEMIA: ICD-10-CM

## 2017-08-03 PROCEDURE — 99214 OFFICE O/P EST MOD 30 MIN: CPT | Performed by: INTERNAL MEDICINE

## 2017-08-03 NOTE — PROGRESS NOTES
HPI:   Sera Noriega is a 66year old male who presents for recheck of his diabetes.  Moving to [place], [facility], [address] [facility],4[address] Floor of [place] [address]  He is stilll under active care under wound care center , and would like a referral for continuing care down Multiple Vitamin (MULTI-VITAMIN OR) Take 1 Tab by mouth daily.  Disp:  Rfl:            Patient Active Problem List:     Obesity, unspecified     Chronic atrial fibrillation ([facility] [address]     Benign neoplasm of colon     Hypertrophy of prostate without urinary obs 20 mg/dL Final   • Creatinine 07/31/2017 1.27  0.70 - 1.30 mg/dL Final   • GFR 07/31/2017 56* >=60 Final   • Calcium, Total 07/31/2017 9.6  8.3 - 10.3 mg/dL Final   • Alkaline Phosphatase 07/31/2017 43* 45 - 117 U/L Final   • AST 07/31/2017 17  15 - 41 U/L Hypercholesterolemia/hyperlipidemia/high cholesterol treatment on statin, Urine microalbumin/nephropathy management: ACE//ARB , Eye and Depression screens are UTD.   See orders for further recommendations  The patient is asked to return in 6 mos for DM, he

## 2017-08-07 ENCOUNTER — OFFICE VISIT (OUTPATIENT)
Dept: WOUND CARE | Facility: HOSPITAL | Age: 72
End: 2017-08-07
Attending: PODIATRIST
Payer: MEDICARE

## 2017-08-07 DIAGNOSIS — E11.621 TYPE II DIABETES MELLITUS WITH FOOT ULCER (HCC): Primary | ICD-10-CM

## 2017-08-07 DIAGNOSIS — L97.522 NON-PRESSURE CHRONIC ULCER OF OTHER PART OF LEFT FOOT WITH FAT LAYER EXPOSED (HCC): ICD-10-CM

## 2017-08-07 DIAGNOSIS — L97.512 RIGHT FOOT ULCER, WITH FAT LAYER EXPOSED (HCC): ICD-10-CM

## 2017-08-07 DIAGNOSIS — L97.919 NON-PRESSURE CHRONIC ULCER OF UNSPECIFIED PART OF RIGHT LOWER LEG WITH UNSPECIFIED SEVERITY (HCC): ICD-10-CM

## 2017-08-07 DIAGNOSIS — L97.509 TYPE II DIABETES MELLITUS WITH FOOT ULCER (HCC): Primary | ICD-10-CM

## 2017-08-07 PROCEDURE — 11042 DBRDMT SUBQ TIS 1ST 20SQCM/<: CPT

## 2017-08-08 RX ORDER — LOVASTATIN 40 MG/1
TABLET ORAL
Qty: 30 TABLET | Refills: 5 | Status: SHIPPED | OUTPATIENT
Start: 2017-08-08

## 2017-08-14 ENCOUNTER — APPOINTMENT (OUTPATIENT)
Dept: WOUND CARE | Age: 72
End: 2017-08-14
Attending: PODIATRIST
Payer: MEDICARE

## 2017-08-14 ENCOUNTER — HOSPITAL ENCOUNTER (OUTPATIENT)
Dept: GENERAL RADIOLOGY | Facility: HOSPITAL | Age: 72
Discharge: HOME | End: 2017-08-14
Attending: PODIATRIST
Payer: MEDICARE

## 2017-08-14 ENCOUNTER — OFFICE VISIT (OUTPATIENT)
Dept: WOUND CARE | Facility: HOSPITAL | Age: 72
End: 2017-08-14
Attending: PODIATRIST
Payer: MEDICARE

## 2017-08-14 DIAGNOSIS — M86.9 OSTEOMYELITIS (HCC): ICD-10-CM

## 2017-08-14 DIAGNOSIS — L97.522 NON-PRESSURE CHRONIC ULCER OF OTHER PART OF LEFT FOOT WITH FAT LAYER EXPOSED (HCC): ICD-10-CM

## 2017-08-14 DIAGNOSIS — M86.9 OSTEOMYELITIS OF OTHER SITE: ICD-10-CM

## 2017-08-14 DIAGNOSIS — L97.512 RIGHT FOOT ULCER, WITH FAT LAYER EXPOSED (HCC): ICD-10-CM

## 2017-08-14 DIAGNOSIS — L97.919 NON-PRESSURE CHRONIC ULCER OF UNSPECIFIED PART OF RIGHT LOWER LEG WITH UNSPECIFIED SEVERITY (HCC): Primary | ICD-10-CM

## 2017-08-14 PROCEDURE — 73630 X-RAY EXAM OF FOOT: CPT | Performed by: PODIATRIST

## 2017-08-14 PROCEDURE — 11042 DBRDMT SUBQ TIS 1ST 20SQCM/<: CPT

## 2017-08-21 ENCOUNTER — OFFICE VISIT (OUTPATIENT)
Dept: WOUND CARE | Facility: HOSPITAL | Age: 72
End: 2017-08-21
Attending: PODIATRIST
Payer: MEDICARE

## 2017-08-21 DIAGNOSIS — L97.512 RIGHT FOOT ULCER, WITH FAT LAYER EXPOSED (HCC): ICD-10-CM

## 2017-08-21 DIAGNOSIS — E11.621 TYPE II DIABETES MELLITUS WITH FOOT ULCER (HCC): ICD-10-CM

## 2017-08-21 DIAGNOSIS — L97.509 TYPE II DIABETES MELLITUS WITH FOOT ULCER (HCC): ICD-10-CM

## 2017-08-21 DIAGNOSIS — L97.919 NON-PRESSURE CHRONIC ULCER OF UNSPECIFIED PART OF RIGHT LOWER LEG WITH UNSPECIFIED SEVERITY (HCC): Primary | ICD-10-CM

## 2017-08-21 DIAGNOSIS — L97.522 NON-PRESSURE CHRONIC ULCER OF OTHER PART OF LEFT FOOT WITH FAT LAYER EXPOSED (HCC): ICD-10-CM

## 2017-08-21 PROCEDURE — 97597 DBRDMT OPN WND 1ST 20 CM/<: CPT

## 2017-08-21 PROCEDURE — 11042 DBRDMT SUBQ TIS 1ST 20SQCM/<: CPT

## 2017-08-22 ENCOUNTER — MED REC SCAN ONLY (OUTPATIENT)
Dept: INTERNAL MEDICINE CLINIC | Facility: CLINIC | Age: 72
End: 2017-08-22

## 2017-08-24 RX ORDER — IBUPROFEN 200 MG
TABLET ORAL
Qty: 100 EACH | Refills: 0 | Status: SHIPPED | OUTPATIENT
Start: 2017-08-24

## 2017-08-28 ENCOUNTER — OFFICE VISIT (OUTPATIENT)
Dept: WOUND CARE | Facility: HOSPITAL | Age: 72
End: 2017-08-28
Attending: PODIATRIST
Payer: MEDICARE

## 2017-08-28 DIAGNOSIS — L97.512 RIGHT FOOT ULCER, WITH FAT LAYER EXPOSED (HCC): ICD-10-CM

## 2017-08-28 DIAGNOSIS — L97.522 NON-PRESSURE CHRONIC ULCER OF OTHER PART OF LEFT FOOT WITH FAT LAYER EXPOSED (HCC): ICD-10-CM

## 2017-08-28 DIAGNOSIS — L97.919 NON-PRESSURE CHRONIC ULCER OF UNSPECIFIED PART OF RIGHT LOWER LEG WITH UNSPECIFIED SEVERITY (HCC): Primary | ICD-10-CM

## 2017-08-28 PROCEDURE — 97597 DBRDMT OPN WND 1ST 20 CM/<: CPT

## 2017-08-28 RX ORDER — WARFARIN SODIUM 5 MG/1
TABLET ORAL
Qty: 90 TABLET | Refills: 0 | OUTPATIENT
Start: 2017-08-28

## 2017-08-31 RX ORDER — GLIMEPIRIDE 2 MG/1
TABLET ORAL
Qty: 90 TABLET | Refills: 0 | Status: SHIPPED | OUTPATIENT
Start: 2017-08-31

## 2017-09-04 DIAGNOSIS — E11.42 TYPE 2 DIABETES MELLITUS WITH DIABETIC POLYNEUROPATHY, WITH LONG-TERM CURRENT USE OF INSULIN (HCC): ICD-10-CM

## 2017-09-04 DIAGNOSIS — Z79.4 TYPE 2 DIABETES MELLITUS WITH DIABETIC POLYNEUROPATHY, WITH LONG-TERM CURRENT USE OF INSULIN (HCC): ICD-10-CM

## 2017-09-06 RX ORDER — LISINOPRIL 2.5 MG/1
TABLET ORAL
Qty: 90 TABLET | Refills: 0 | Status: SHIPPED | OUTPATIENT
Start: 2017-09-06

## 2017-09-08 RX ORDER — WARFARIN SODIUM 5 MG/1
TABLET ORAL
Qty: 90 TABLET | Refills: 0 | Status: SHIPPED | OUTPATIENT
Start: 2017-09-08

## 2017-09-08 NOTE — TELEPHONE ENCOUNTER
Last OV pertinent to medication: 8/3/2017 - DM check  Last refill date: 1/9/2017     #/refills: 90/1  When pt was asked to return for OV: 6 months- DM check  * Patient is continuing Care in [address] [place] [address] with Brother*  Upcoming appt/reason: none

## 2017-09-11 ENCOUNTER — OFFICE VISIT (OUTPATIENT)
Dept: WOUND CARE | Facility: HOSPITAL | Age: 72
End: 2017-09-11
Attending: PODIATRIST
Payer: MEDICARE

## 2017-09-11 ENCOUNTER — TELEPHONE (OUTPATIENT)
Dept: INTERNAL MEDICINE CLINIC | Facility: CLINIC | Age: 72
End: 2017-09-11

## 2017-09-11 DIAGNOSIS — L03.119 CELLULITIS OF FOOT: ICD-10-CM

## 2017-09-11 DIAGNOSIS — L97.522 NON-PRESSURE CHRONIC ULCER OF OTHER PART OF LEFT FOOT WITH FAT LAYER EXPOSED (HCC): ICD-10-CM

## 2017-09-11 DIAGNOSIS — L97.509 DIABETIC FOOT ULCER ASSOCIATED WITH TYPE 2 DIABETES MELLITUS, UNSPECIFIED LATERALITY, UNSPECIFIED PART OF FOOT, UNSPECIFIED ULCER STAGE (HCC): ICD-10-CM

## 2017-09-11 DIAGNOSIS — E11.621 DIABETIC FOOT ULCER ASSOCIATED WITH TYPE 2 DIABETES MELLITUS, UNSPECIFIED LATERALITY, UNSPECIFIED PART OF FOOT, UNSPECIFIED ULCER STAGE (HCC): ICD-10-CM

## 2017-09-11 DIAGNOSIS — E11.42 POLYNEUROPATHY IN DIABETES(357.2): Primary | ICD-10-CM

## 2017-09-11 DIAGNOSIS — L97.512 RIGHT FOOT ULCER, WITH FAT LAYER EXPOSED (HCC): ICD-10-CM

## 2017-09-11 DIAGNOSIS — L97.919 NON-PRESSURE CHRONIC ULCER OF UNSPECIFIED PART OF RIGHT LOWER LEG WITH UNSPECIFIED SEVERITY (HCC): Primary | ICD-10-CM

## 2017-09-11 PROCEDURE — 99215 OFFICE O/P EST HI 40 MIN: CPT

## 2017-09-11 RX ORDER — WARFARIN SODIUM 5 MG/1
TABLET ORAL
Qty: 90 TABLET | Refills: 0 | OUTPATIENT
Start: 2017-09-11

## 2017-09-11 NOTE — TELEPHONE ENCOUNTER
Patient is moving to [place] next week and called to get a \"referral\" to see a Dr. Gil Simental at [facility], [facility], [facility], [address] [facility].   Per patient, the [address] [facility] [address] [facility] [address] advised him that he cannot be seen without a referra

## 2017-09-11 NOTE — TELEPHONE ENCOUNTER
Please add diabetic foot ulcers to diagnosis for [address] [facility] podiatrist thanks

## 2017-09-11 NOTE — TELEPHONE ENCOUNTER
Patient called to get the status of this medication, advised to patient that the pharmacy received the script. Advised to patient to call pharmacy and to call us back with any additional questions.

## 2017-09-11 NOTE — TELEPHONE ENCOUNTER
Spoke with pt. States that he saw Dr. Cr Emmanuel today. States that Dr. Cr Emmanuel said he would call Dr. Lashell Slade in [place] himself tomorrow. Pt states that when he contacted Dr. Jovanna Conley office he was told that a referral was needed from his pcp.     Pt asking if D

## 2017-09-11 NOTE — TELEPHONE ENCOUNTER
Noted below. Referral order placed. Spoke with pt. Advised that referral placed. Pt voiced understanding.

## 2017-09-26 NOTE — TELEPHONE ENCOUNTER
Per Verónica at Dr Ramone Vega's office, wound care, Dr Jennifer Hernández is podiatrist, pt wound is on his foot.  Pt needs referral to Dr Alex Chavez, not insurance referral but medical referral. Just need piece of paper with pt name, dx and reason to be seen to fax to 3

## 2017-09-26 NOTE — TELEPHONE ENCOUNTER
Spoke with Verónica, per their hospital protocol, they need the paper that was faxed to read \"wound care\", referral not necessary but okay to re-send referral with \"wound care\" written on it. Re-faxed with \"wound care\" noted.

## 2017-09-26 NOTE — TELEPHONE ENCOUNTER
Sam Schmitz called again, now she needs to have \"podiatrist\" it needs to say \"wound care. \" Sent call back to triage, to address since patient is at wound care clinic.

## 2017-09-28 ENCOUNTER — MED REC SCAN ONLY (OUTPATIENT)
Dept: INTERNAL MEDICINE CLINIC | Facility: CLINIC | Age: 72
End: 2017-09-28

## 2023-10-07 NOTE — TELEPHONE ENCOUNTER
Verónica called again, she needed this referral right way, PSR printed out \"Vendor Referral for podiatrist\" and faxing it to her at [phone]. Fax confirmed. Janesedin said she does not need us to send over a DX and reason for visit at this time.  Candy 4 = No assist / stand by assistance

## (undated) DEVICE — GLOVE BIOGEL M SURG SZ 71/2

## (undated) DEVICE — STANDARD HYPODERMIC NEEDLE,POLYPROPYLENE HUB: Brand: MONOJECT

## (undated) DEVICE — BLADE SAW KM33-11

## (undated) DEVICE — PREMIUM WET SKIN PREP TRAY: Brand: MEDLINE INDUSTRIES, INC.

## (undated) DEVICE — SOL  .9 1000ML BTL

## (undated) DEVICE — REM POLYHESIVE ADULT PATIENT RETURN ELECTRODE: Brand: VALLEYLAB

## (undated) DEVICE — ZIMMER® STERILE DISPOSABLE TOURNIQUET CUFF WITH PLC, DUAL PORT, SINGLE BLADDER, 18 IN. (46 CM)

## (undated) DEVICE — NON-ADHERENT STRIPS,OIL EMULSION: Brand: CURITY

## (undated) DEVICE — LOWER EXTREMITY CDS-LF: Brand: MEDLINE INDUSTRIES, INC.

## (undated) DEVICE — KENDALL SCD EXPRESS SLEEVES, KNEE LENGTH, MEDIUM: Brand: KENDALL SCD

## (undated) DEVICE — STRETCH BANDAGE ROLL: Brand: DERMACEA

## (undated) NOTE — MR AVS SNAPSHOT
[address] [address] [phone]               Thank you for choosing us for your health care visit with Rodolfo Carrillo MD.  We are glad to serve you and happy to provid Place 5 drops into both ears 2 (two) times daily.    Commonly known as:  EAR DROPS EARWAX AID           DIGOX 0.25 MG Tabs   Generic drug:  digoxin   TAKE 1 TABLET BY MOUTH EVERY MORNING           gabapentin 300 MG Caps   TAKE 1 CAPSULE BY MOUTH THREE TIMES

## (undated) NOTE — LETTER
[facility]  Isac Panchal 60 [address] [facility], [address] [place] [address]    Consent for Operation    Date: __________________    Time: _______________    1.  I authorize the performance upon Macie Sutton the following operation:    Procedure(s):  AMPUTATION THIRD procedure has been videotaped, the surgeon will obtain the original videotape. The hospital will not be responsible for storage or maintenance of this tape.     6. For the purpose of advancing medical education, I consent to the admittance of observers to t STATEMENTS REQUIRING INSERTION OR COMPLETION WERE FILLED IN.     Signature of Patient:   ___________________________    When the patient is a minor or mentally incompetent to give consent:  Signature of person authorized to consent for patient: ____________ supplements, and pills I can buy without a prescription (including street drugs/illegal medications). Failure to inform my anesthesiologist about these medicines may increase my risk of anesthetic complications.   · If I am allergic to anything or have had Anesthesiologist Signature     Date   Time  I have discussed the procedure and information above with the patient (or patient’s representative) and answered their questions. The patient or their representative has agreed to have anesthesia services.     ___

## (undated) NOTE — IP AVS SNAPSHOT
Patient Demographics     Address Phone E-mail Address    [address] [place] [address] [phone] (Home)  [phone] (Mobile) *Preferred* [email or web address]      Emergency Contact(s)     Name Relation Home Work Mobile    Suleiman Sutton TAKE these medications        Instructions Authorizing Provider    Morning Afternoon Evening As Needed    CeFAZolin Sodium Soln   Last time this was given:  2 g on 3/25/2017 12:29 PM   Commonly known as:  ANCEF/KEFZOL   Next dose due:  8:30 3/25/17 Last time this was given:  100 mg on 3/25/2017 10:37 AM   Commonly known as:   Toprol XL   Next dose due:  3/26/17        TAKE 1 TABLET BY MOUTH EVERY DAY    Kenton Menon                           MULTI-VITAMIN OR   Next dose due:  3/26/17        Take [id number] gabapentin (NEURONTIN) cap 600 mg 03/24/17 2154 Given      [id number] lisinopril (PRINIVIL,ZESTRIL) tab 2.5 mg 03/25/17 1036 Given            RIGHT UPPER ARM     Order ID Medication Name Action Time Action Reason Comments    [id number] insulin det Microbiology Results (All)     Procedure Component Value Units Date/Time    Anaerobic Culture [[id number]] Collected:  03/21/17 1952    Order Status:  Completed Lab Status:  Preliminary result Updated:  03/25/17 1322    Specimen Information:  Tissue f Specimen Information:  Tissue from Toe      Tissue Culture Result Light Staphylococcus aureus (A)      Tissue Smear Many Neutrophils seen       Occasional Gram Positive Cocci     Susceptibility      Staphylococcus aureus     Not Specified    Clindamycin < numbness or tingling the upper extremities. Patient does have neuropathy in his lower extremities. No headache, sinus/nasal congestion, or cough. No hematochezia, melena, hematuria, hemoptysis, or hematemesis.     Past Medical History:  Past Medical Hist SURGICAL STENT  feb. 2011    Comment SFA    OTHER SURGICAL HISTORY  12/2013    Comment small toe right foot, amputation    APPENDECTOMY         Social History:  reports that he quit smoking about 25 years ago. His smoking use included Cigarettes.  He has a INSULIN SYRINGE 29G X 1/2\" 1 ML Does not apply Misc AS DIRECTED Disp: 100 each Rfl: 3       Review of Systems:   A comprehensive 14 point review of systems was completed. Pertinent positives and negatives noted in the HPI.     Physical Exam:    /9 3. Type 2 diabetes-we will hold oral medications placed on hyperglycemia protocol. Will continue on Levemir insulin and will order correction factor as well. 4. Atrial fibrillation-patient rate controlled on metoprolol and digoxin.   Will also continue Co HISTORY OF PRESENT ILLNESS:   Patient is a 66year old male who has been referred regarding perfusion of his lower extremities. He has a hx of of DM chronic non healing R DFU.  He was being seen at the [facility] when they noticed significant worsening and Blood flow seen within right SFA stent, left SFA and popliteal stent.     Right lower extremity waveforms: Triphasic to the proximal popliteal, and mixe biphasic and monophasic below the knee.     Left lower extremity waveforms: Mixed triphasic and biphasi • High blood pressure    • Peripheral vascular disease ([facility] [address]    • Visual impairment    • Osteoarthritis        PAST SURGICAL HISTORY:       Past Surgical History    FOOT/TOES SURGERY PROC UNLISTED  2003    Comment for foot ulcer    COLONOSCOPY  2008,2012 HYDROcodone-acetaminophen (NORCO) 5-325 MG per tab 2 tablet, 2 tablet, Oral, Q4H PRN  •  [MAR Hold] docusate sodium (COLACE) cap 100 mg, 100 mg, Oral, BID  •  [MAR Hold] PEG 3350 (MIRALAX) powder packet 17 g, 17 g, Oral, Daily PRN  •  [MAR Hold] magnesium NECK: supple, no lymphadenopathy, thyroid wnl  CAROTID: No bruits  RESPIRATORY: no rales, rhonchi, or wheezes B  CARDIO: RRR without murmur, no murmur, no gallop   ABDOMEN: soft, non-tender with no palpable aneurysm or masses  BACK: normal, no tenderness noted. Moderate to marked hallux valgus is again noted. Folding of the second toe over the third toe is noted. Gas overlying the soft tissues of the second and fourth toe is noted.  Please correlate for possibility of soft tissue infection in this location osteomyelitis involving the base of the second proximal phalanx. There is also abnormal degree of tenosynovial fluid distention along the flexor tendon of the second digit concerning for infectious tenosynovitis.  No michelle tendon tear or tendon injury is o Occupational Therapy Notes (last 72 hours) (Notes from 3/22/2017  2:06 PM through 3/25/2017  2:06 PM)     No notes of this type exist for this encounter. Video Swallow Study Notes     No notes of this type exist for this encounter.       SLP Notes

## (undated) NOTE — MR AVS SNAPSHOT
[address] [address] [phone]               Thank you for choosing us for your health care visit with Rodolfo Carrillo MD.  We are glad to serve you and happy to provid What changed:  See the new instructions.    Commonly known as:  NEURONTIN           glimepiride 2 MG Tabs   TAKE 1 TABLET BY MOUTH DAILY WITH BREAKFAST   Commonly known as:  AMARYL           Insulin Syringe 29G X 1/2\" 1 ML Misc   AS DIRECTED           BIBI If you are confident that your benefit plan will not require a referral or authorization, such as [place] Medicaid, please feel free to schedule your appointment immediately.  However, if you are unsure about the requirements for authorization, please wait

## (undated) NOTE — IP AVS SNAPSHOT
[facility] [place] [address] [place], [address] ~ [phone]                Discharge Summary   3/20/2017    Kevin Sutton           Admission Information        Provider Department    3/20/2017 Lexus Heller MD  3sw-A Warfarin Sodium 5 MG Tabs   Last time this was given:  2.5 mg on 3/24/2017  9:54 PM   Commonly known as:  COUMADIN   What changed:    - how much to take  - how to take this  - when to take this  - additional instructions   Next dose due:   Tonight 3/25/17 Jenny Agosto                           Metoprolol Succinate  MG Tb24   Last time this was given:  100 mg on 3/25/2017 10:37 AM   Commonly known as:   Toprol XL   Next dose due:  3/26/17        TAKE 1 TABLET BY MOUTH EVERY DAY    Salvador Calhoun Specialties:  Internal Medicine, IP Consult to Primary Care    Contact information:    [address]          Follow up with Kelly Mckeon DPM In 1 week.     Specialty:  Pauleen Lundborg informat (03/03/17)  80.3 (03/03/17)  9.1 (03/03/17)  7.8 (03/03/17)  1.4 (03/03/17)  1.2  (03/03/17)  7.87 (H) (03/03/17)  0.89 (L) (03/03/17)  0.76 (H) (03/03/17)  0.14 (03/03/17)  0.83 (H)      Metabolic Lab Results  (Last result in the past 90 days)    HgbA1C Medication Side Effects - Medications to be taken at home  As your caregivers, we want you to be aware of the medications you are prescribed to take and their potential SIDE EFFECTS.  Your nurse will review your medications with you before you are discharge or nosebleeds             Blood Sugar Medications     GLIMEPIRIDE 2 MG Oral Tab    LEVEMIR 100 UNIT/ML Subcutaneous Solution         Use:  Treat high blood sugar   Most common side effects: Low blood sugar:nausea, jitters, sweating, rapid heartbeat    What

## (undated) NOTE — Clinical Note
1/19/2017    Dear Dr. Odin Callahan would like to refer you Rafael Sutton.     Referring Provider: Markus Alexander MD    Fax: [phone]    As soon as the patient is seen please complete the form below and fax to the referring provider without a cover

## (undated) NOTE — MR AVS SNAPSHOT
[address] [place] [id number] [phone]               Thank you for choosing us for your health care visit with Vero Bruce MD.  We are glad to serve you and happy to provid Chronic bilateral low back pain without sciatica    HTN (hypertension)    Osteomyelitis of foot (HCC)    PAD (peripheral artery disease) ([address]    Type 2 diabetes mellitus with diabetic polyneuropathy, with long-term current use of insulin ([address]    Fady Pack medications prescribed for you. Read the directions carefully, and ask your doctor or other care provider to review them with you.          Where to Get Your Medications      These medications were sent to [address], [address] ? Avoid walking on snowy or icy surfaces. ? Use a cane or walker (indoors and out) if you are unsteady on your feet.              Visit [facility] online at  [email or web address]

## (undated) NOTE — LETTER
Stella Redmond [address] [facility] [address], [address] [facility]     PICC LINE INSERTION CONSENT     I agree to have a Peripherally Inserted Central Catheter (PICC) placed in my arm.    1. The PICC insertion procedure, care, maintenance, risks, benefits, and c also discussed reasonable alternatives to the PICC, including risks, benefits, and side effects related to the alternatives and risks related to not receiving this procedure      _____________________ ___________ ____________________________________   Date

## (undated) NOTE — Clinical Note
LYNDSEY: TCM from [facility]. Pt doing well. Pt wants to discuss his low back pain and his rt shoulder pain at appt on 5/1. He states no relief when aggrevated, doesn't take any meds for this.  They tried an analgesic balm at [facility] but only for 2 days and now d/c so he